# Patient Record
Sex: FEMALE | Race: AMERICAN INDIAN OR ALASKA NATIVE | NOT HISPANIC OR LATINO | Employment: OTHER | URBAN - METROPOLITAN AREA
[De-identification: names, ages, dates, MRNs, and addresses within clinical notes are randomized per-mention and may not be internally consistent; named-entity substitution may affect disease eponyms.]

---

## 2023-11-06 ENCOUNTER — DOCUMENTATION ONLY (OUTPATIENT)
Dept: OTHER | Facility: CLINIC | Age: 68
End: 2023-11-06
Payer: MEDICARE

## 2023-11-06 VITALS
HEIGHT: 66 IN | RESPIRATION RATE: 18 BRPM | WEIGHT: 192 LBS | DIASTOLIC BLOOD PRESSURE: 75 MMHG | HEART RATE: 67 BPM | OXYGEN SATURATION: 91 % | SYSTOLIC BLOOD PRESSURE: 129 MMHG | BODY MASS INDEX: 30.86 KG/M2 | TEMPERATURE: 96.8 F

## 2023-11-06 PROBLEM — H43.9: Status: ACTIVE | Noted: 2023-11-06

## 2023-11-06 PROBLEM — N39.0 RECURRENT URINARY TRACT INFECTION: Status: ACTIVE | Noted: 2023-11-06

## 2023-11-06 PROBLEM — F41.9 ANXIETY DISORDER: Status: ACTIVE | Noted: 2023-11-06

## 2023-11-06 PROBLEM — E55.9 VITAMIN D DEFICIENCY: Status: ACTIVE | Noted: 2023-11-06

## 2023-11-06 PROBLEM — G51.4 FACIAL MYOKYMIA: Status: ACTIVE | Noted: 2023-11-06

## 2023-11-06 PROBLEM — G40.89 OTHER SEIZURES (H): Status: ACTIVE | Noted: 2023-11-06

## 2023-11-06 PROBLEM — F02.80 ALZHEIMER'S DEMENTIA (H): Status: ACTIVE | Noted: 2022-01-31

## 2023-11-06 PROBLEM — H52.4 PRESBYOPIA: Status: ACTIVE | Noted: 2023-11-06

## 2023-11-06 PROBLEM — G30.9 ALZHEIMER'S DEMENTIA (H): Status: ACTIVE | Noted: 2022-01-31

## 2023-11-06 PROBLEM — R41.3 MEMORY IMPAIRMENT: Status: ACTIVE | Noted: 2023-11-06

## 2023-11-06 PROBLEM — F32.A DEPRESSION: Status: ACTIVE | Noted: 2023-11-06

## 2023-11-06 RX ORDER — LANOLIN ALCOHOL/MO/W.PET/CERES
1000 CREAM (GRAM) TOPICAL DAILY
COMMUNITY
End: 2023-11-20

## 2023-11-06 RX ORDER — HYDROXYZINE HYDROCHLORIDE 25 MG/1
12.5 TABLET, FILM COATED ORAL EVERY 6 HOURS PRN
COMMUNITY
End: 2024-06-27

## 2023-11-06 RX ORDER — DONEPEZIL HYDROCHLORIDE 10 MG/1
10 TABLET, FILM COATED ORAL DAILY
COMMUNITY
Start: 2023-08-09 | End: 2023-11-20

## 2023-11-06 RX ORDER — VITAMIN B COMPLEX
25 TABLET ORAL DAILY
COMMUNITY
End: 2023-11-20

## 2023-11-06 RX ORDER — ASPIRIN 81 MG/1
81 TABLET ORAL DAILY
COMMUNITY
End: 2023-11-07

## 2023-11-06 RX ORDER — DONEPEZIL HYDROCHLORIDE 5 MG/1
5 TABLET, FILM COATED ORAL DAILY
COMMUNITY
Start: 2023-08-09 | End: 2023-12-04

## 2023-11-06 RX ORDER — MEMANTINE HYDROCHLORIDE 10 MG/1
1 TABLET ORAL 2 TIMES DAILY
COMMUNITY
Start: 2023-08-09 | End: 2023-11-20

## 2023-11-06 RX ORDER — CHLORAL HYDRATE 500 MG
1 CAPSULE ORAL 2 TIMES DAILY
COMMUNITY
End: 2023-11-20

## 2023-11-06 RX ORDER — NITROFURANTOIN MACROCRYSTAL 100 MG
100 CAPSULE ORAL DAILY
COMMUNITY
End: 2023-12-04

## 2023-11-06 RX ORDER — ESCITALOPRAM OXALATE 5 MG/1
5 TABLET ORAL DAILY
COMMUNITY
End: 2023-11-20

## 2023-11-06 NOTE — PROGRESS NOTES
[place] [facility]  PRIMARY CARE PROVIDER AND CLINIC:  Ifeanyi Chandler, REI CNP, [address] [facility] [address]  Chief Complaint   Patient presents with    Establish Care     [place] Medical Record Number:  [id number]  Place of Service where encounter took place:  [facility] [[id number]]    Kim Herrera  is a 68 year old  (1955),  admitted to the above facility on 11/3/23 .  Admitted to this facility for  rehab, medical management, and nursing care.    HPI:    HPI information obtained from: facility chart records, facility staff, patient report, and [place] Epic chart review.     Resident new to [facility] care. Alert, confused at baseline but pleasant. Denies any acute concerns. Her  is very happy this has mostly been a good transition for her. She declined dinner today stating she wasn't hungry.  History of UTI and responded nicely to treatment. Coming into the facility on prophylaxis  dose of antibiotic.     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  PAST SURGICAL HISTORY:   has a past surgical history that includes appendectomy and Replacement Total Knee (Right).  FAMILY HISTORY: family history includes Arthritis in her father; Colon Cancer in her mother; Hypertension in her sister; Neuropathy in her sister.  SOCIAL HISTORY:   reports that she has never smoked. She has never  "used smokeless tobacco. She reports current alcohol use. She reports that she does not use drugs.    Post Discharge Medication Reconciliation Status: patient was not discharged from an inpatient facility or TCU    Current Outpatient Medications   Medication Sig Dispense Refill    aspirin 81 MG EC tablet Take 81 mg by mouth daily      Calcium Carbonate-Vitamin D (CALCIUM-VITAMIN D3 PO) Take 1 tablet by mouth 2 times daily      cyanocobalamin (VITAMIN B-12) 1000 MCG tablet Take 1,000 mcg by mouth daily      donepezil (ARICEPT) 10 MG tablet Take 10 mg by mouth daily Take with 5 mg for a total of 15 mg      donepezil (ARICEPT) 5 MG tablet Take 5 mg by mouth daily Take with 10 mg for a total of 15 mg      escitalopram (LEXAPRO) 5 MG tablet Take 5 mg by mouth daily      fish oil-omega-3 fatty acids 1000 MG capsule Take 1 g by mouth 2 times daily      hydrOXYzine (ATARAX) 25 MG tablet Take 12.5 mg by mouth every 6 hours as needed for itching      memantine (NAMENDA) 10 MG tablet Take 1 tablet by mouth 2 times daily      nitroFURantoin macrocrystal (MACRODANTIN) 100 MG capsule Take 100 mg by mouth daily      Vitamin D3 (CHOLECALCIFEROL) 25 mcg (1000 units) tablet Take 25 mcg by mouth daily       ROS:  Limited secondary to cognitive impairment but today pt reports ok    Vitals:  /75   Pulse 67   Temp 96.8  F (36  C)   Resp 18   Ht 1.676 m (5' 6\")   Wt 87.1 kg (192 lb)   SpO2 91%   BMI 30.99 kg/m    Exam:  GENERAL APPEARANCE:  Alert, in no distress  ENT:  Mouth and posterior oropharynx normal, moist mucous membranes, normal hearing acuity  EYES:  EOM, conjunctivae, lids, pupils and irises normal  RESP:  lungs clear to auscultation   CV:  regular rate and rhythm, no murmur, rub, or gallop, no edema  ABDOMEN:  no guarding or rebound  M/S:   Gait and station normal  SKIN:  limited but intact to visualized areas  NEURO:   Cranial nerves 2-12 are normal tested and grossly at patient's baseline  PSYCH:  insight and " judgement impaired, memory impaired     Lab/Diagnostic data:  Ordering updated labs today    ASSESSMENT/PLAN:  (G30.0,  F02.A18) 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 Doing well with transition to  so will hold on any changes today  Plan:   -Aricept 15 mg po daily  -namenda 10 mg po BID  -escitalopram 5 mg po daily   -atarax prn    (G40.89) Other seizures (H)  Comment: 8/2023: EEG did not show any epileptiform discharges or seizures. It showed diffuse slowing which can be seen in patient's with dementia. MRI brain also did not show any stroke,   Plan:   -monitor for s/s     (M17.12) Primary osteoarthritis of left knee  Comment: chronic   Plan:   -consider tylenol prn   -calcium and vitamin D    (N39.0) Recurrent urinary tract infection  Comment: hx of   Plan:   -macrodantin 100 mg po daily    (E55.9) Vitamin D deficiency  Comment: on supplement   Plan:   -daily supplement   -checking lab    (E53.8) Vitamin B12 deficiency (non anemic)  Comment: hx of   Plan:   -daily supplement and checking labs    (E78.49) Other hyperlipdemia  Comment: by hx  Plan:  -fish oil daily     (Z78.9) POLST (Physician Orders for Life-Sustaining Treatment)  Comment: reviewed with family  Plan:  -reviewed with family    -will stop ASA for primary prevention    Electronically signed by:  REI Pino CNP

## 2023-11-07 ENCOUNTER — ASSISTED LIVING VISIT (OUTPATIENT)
Dept: GERIATRICS | Facility: CLINIC | Age: 68
End: 2023-11-07
Payer: MEDICARE

## 2023-11-07 DIAGNOSIS — N39.0 RECURRENT URINARY TRACT INFECTION: ICD-10-CM

## 2023-11-07 DIAGNOSIS — E55.9 VITAMIN D DEFICIENCY: ICD-10-CM

## 2023-11-07 DIAGNOSIS — F02.A18 MILD EARLY ONSET ALZHEIMER'S DEMENTIA WITH OTHER BEHAVIORAL DISTURBANCE (H): Primary | ICD-10-CM

## 2023-11-07 DIAGNOSIS — Z78.9 POLST (PHYSICIAN ORDERS FOR LIFE-SUSTAINING TREATMENT): ICD-10-CM

## 2023-11-07 DIAGNOSIS — G40.89 OTHER SEIZURES (H): ICD-10-CM

## 2023-11-07 DIAGNOSIS — E78.49 OTHER HYPERLIPIDEMIA: ICD-10-CM

## 2023-11-07 DIAGNOSIS — G30.0 MILD EARLY ONSET ALZHEIMER'S DEMENTIA WITH OTHER BEHAVIORAL DISTURBANCE (H): Primary | ICD-10-CM

## 2023-11-07 DIAGNOSIS — M17.12 PRIMARY OSTEOARTHRITIS OF LEFT KNEE: ICD-10-CM

## 2023-11-07 DIAGNOSIS — E53.8 VITAMIN B12 DEFICIENCY (NON ANEMIC): ICD-10-CM

## 2023-11-07 DIAGNOSIS — F41.9 ANXIETY DISORDER, UNSPECIFIED TYPE: ICD-10-CM

## 2023-11-07 PROCEDURE — 99344 HOME/RES VST NEW MOD MDM 60: CPT | Performed by: NURSE PRACTITIONER

## 2023-11-07 NOTE — LETTER
11/7/2023        RE: Kim Herrera  C/o Herbie Herrera  [address] [facility] [address]        [place] [facility]  PRIMARY CARE PROVIDER AND CLINIC:  REI Pino [facility], [address] [facility] [address]  Chief Complaint   Patient presents with     [facility]     [place] Medical Record Number:  [id number]  Place of Service where encounter took place:  [facility] ([facility] [[id number]]    Kim Herrera  is a 68 year old  (1955),  admitted to the above facility on 11/3/23 .  Admitted to this facility for  rehab, medical management, and nursing care.    HPI:    HPI information obtained from: facility chart records, facility staff, patient report, and [facility] chart review.     Resident new to memory care. Alert, confused at baseline but pleasant. Denies any acute concerns. Her  is very happy this has mostly been a good transition for her. She declined dinner today stating she wasn't hungry.  History of UTI and responded nicely to treatment. Coming into the facility on prophylaxis  dose of antibiotic.     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  PAST SURGICAL HISTORY:   has a past surgical history that includes appendectomy and Replacement Total Knee (Right).  FAMILY HISTORY: family history includes Arthritis in her father; Colon Cancer in her mother; Hypertension in  "her sister; Neuropathy in her sister.  SOCIAL HISTORY:   reports that she has never smoked. She has never used smokeless tobacco. She reports current alcohol use. She reports that she does not use drugs.    Post Discharge Medication Reconciliation Status: patient was not discharged from an inpatient facility or TCU    Current Outpatient Medications   Medication Sig Dispense Refill     aspirin 81 MG EC tablet Take 81 mg by mouth daily       Calcium Carbonate-Vitamin D (CALCIUM-VITAMIN D3 PO) Take 1 tablet by mouth 2 times daily       cyanocobalamin (VITAMIN B-12) 1000 MCG tablet Take 1,000 mcg by mouth daily       donepezil (ARICEPT) 10 MG tablet Take 10 mg by mouth daily Take with 5 mg for a total of 15 mg       donepezil (ARICEPT) 5 MG tablet Take 5 mg by mouth daily Take with 10 mg for a total of 15 mg       escitalopram (LEXAPRO) 5 MG tablet Take 5 mg by mouth daily       fish oil-omega-3 fatty acids 1000 MG capsule Take 1 g by mouth 2 times daily       hydrOXYzine (ATARAX) 25 MG tablet Take 12.5 mg by mouth every 6 hours as needed for itching       memantine (NAMENDA) 10 MG tablet Take 1 tablet by mouth 2 times daily       nitroFURantoin macrocrystal (MACRODANTIN) 100 MG capsule Take 100 mg by mouth daily       Vitamin D3 (CHOLECALCIFEROL) 25 mcg (1000 units) tablet Take 25 mcg by mouth daily       ROS:  Limited secondary to cognitive impairment but today pt reports ok    Vitals:  /75   Pulse 67   Temp 96.8  F (36  C)   Resp 18   Ht 1.676 m (5' 6\")   Wt 87.1 kg (192 lb)   SpO2 91%   BMI 30.99 kg/m    Exam:  GENERAL APPEARANCE:  Alert, in no distress  ENT:  Mouth and posterior oropharynx normal, moist mucous membranes, normal hearing acuity  EYES:  EOM, conjunctivae, lids, pupils and irises normal  RESP:  lungs clear to auscultation   CV:  regular rate and rhythm, no murmur, rub, or gallop, no edema  ABDOMEN:  no guarding or rebound  M/S:   Gait and station normal  SKIN:  limited but intact to " visualized areas  NEURO:   Cranial nerves 2-12 are normal tested and grossly at patient's baseline  PSYCH:  insight and judgement impaired, memory impaired     Lab/Diagnostic data:  Ordering updated labs today    ASSESSMENT/PLAN:  (G30.0,  F02.A18) 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 Doing well with transition to  so will hold on any changes today  Plan:   -Aricept 15 mg po daily  -namenda 10 mg po BID  -escitalopram 5 mg po daily   -atarax prn    (G40.89) Other seizures (H)  Comment: 8/2023: EEG did not show any epileptiform discharges or seizures. It showed diffuse slowing which can be seen in patient's with dementia. MRI brain also did not show any stroke,   Plan:   -monitor for s/s     (M17.12) Primary osteoarthritis of left knee  Comment: chronic   Plan:   -consider tylenol prn   -calcium and vitamin D    (N39.0) Recurrent urinary tract infection  Comment: hx of   Plan:   -macrodantin 100 mg po daily    (E55.9) Vitamin D deficiency  Comment: on supplement   Plan:   -daily supplement   -checking lab    (E53.8) Vitamin B12 deficiency (non anemic)  Comment: hx of   Plan:   -daily supplement and checking labs    (E78.49) Other hyperlipdemia  Comment: by hx  Plan:  -fish oil daily     (Z78.9) POLST (Physician Orders for Life-Sustaining Treatment)  Comment: reviewed with family  Plan:  -reviewed with family    -will stop ASA for primary prevention    Electronically signed by:  REI Pino CNP                   Sincerely,        REI Pino CNP

## 2023-11-08 ENCOUNTER — LAB REQUISITION (OUTPATIENT)
Dept: LAB | Facility: CLINIC | Age: 68
End: 2023-11-08
Payer: MEDICARE

## 2023-11-08 DIAGNOSIS — E03.9 HYPOTHYROIDISM, UNSPECIFIED: ICD-10-CM

## 2023-11-08 DIAGNOSIS — E55.9 VITAMIN D DEFICIENCY, UNSPECIFIED: ICD-10-CM

## 2023-11-08 DIAGNOSIS — E78.5 HYPERLIPIDEMIA, UNSPECIFIED: ICD-10-CM

## 2023-11-14 LAB
ALBUMIN SERPL BCG-MCNC: 3.8 G/DL (ref 3.5–5.2)
ALP SERPL-CCNC: 67 U/L (ref 35–104)
ALT SERPL W P-5'-P-CCNC: 16 U/L (ref 0–50)
ANION GAP SERPL CALCULATED.3IONS-SCNC: 14 MMOL/L (ref 7–15)
AST SERPL W P-5'-P-CCNC: 22 U/L (ref 0–45)
BILIRUB DIRECT SERPL-MCNC: <0.2 MG/DL (ref 0–0.3)
BILIRUB SERPL-MCNC: 0.3 MG/DL
BUN SERPL-MCNC: 11.4 MG/DL (ref 8–23)
CALCIUM SERPL-MCNC: 9.2 MG/DL (ref 8.8–10.2)
CHLORIDE SERPL-SCNC: 106 MMOL/L (ref 98–107)
CHOLEST SERPL-MCNC: 228 MG/DL
CREAT SERPL-MCNC: 0.76 MG/DL (ref 0.51–0.95)
DEPRECATED HCO3 PLAS-SCNC: 22 MMOL/L (ref 22–29)
EGFRCR SERPLBLD CKD-EPI 2021: 85 ML/MIN/1.73M2
ERYTHROCYTE [DISTWIDTH] IN BLOOD BY AUTOMATED COUNT: 13.1 % (ref 10–15)
GLUCOSE SERPL-MCNC: 90 MG/DL (ref 70–99)
HCT VFR BLD AUTO: 42.7 % (ref 35–47)
HDLC SERPL-MCNC: 56 MG/DL
HGB BLD-MCNC: 14 G/DL (ref 11.7–15.7)
LDLC SERPL CALC-MCNC: 136 MG/DL
MCH RBC QN AUTO: 30.8 PG (ref 26.5–33)
MCHC RBC AUTO-ENTMCNC: 32.8 G/DL (ref 31.5–36.5)
MCV RBC AUTO: 94 FL (ref 78–100)
NONHDLC SERPL-MCNC: 172 MG/DL
PLATELET # BLD AUTO: 250 10E3/UL (ref 150–450)
POTASSIUM SERPL-SCNC: 3.8 MMOL/L (ref 3.4–5.3)
PROT SERPL-MCNC: 6.8 G/DL (ref 6.4–8.3)
RBC # BLD AUTO: 4.55 10E6/UL (ref 3.8–5.2)
SODIUM SERPL-SCNC: 142 MMOL/L (ref 135–145)
TRIGL SERPL-MCNC: 181 MG/DL
TSH SERPL DL<=0.005 MIU/L-ACNC: 1.96 UIU/ML (ref 0.3–4.2)
VIT B12 SERPL-MCNC: 1461 PG/ML (ref 232–1245)
VIT D+METAB SERPL-MCNC: 35 NG/ML (ref 20–50)
WBC # BLD AUTO: 5 10E3/UL (ref 4–11)

## 2023-11-14 PROCEDURE — 82248 BILIRUBIN DIRECT: CPT | Mod: ORL | Performed by: NURSE PRACTITIONER

## 2023-11-14 PROCEDURE — 80061 LIPID PANEL: CPT | Mod: ORL | Performed by: NURSE PRACTITIONER

## 2023-11-14 PROCEDURE — 84443 ASSAY THYROID STIM HORMONE: CPT | Mod: ORL | Performed by: NURSE PRACTITIONER

## 2023-11-14 PROCEDURE — P9604 ONE-WAY ALLOW PRORATED TRIP: HCPCS | Mod: ORL | Performed by: NURSE PRACTITIONER

## 2023-11-14 PROCEDURE — 82306 VITAMIN D 25 HYDROXY: CPT | Mod: ORL | Performed by: NURSE PRACTITIONER

## 2023-11-14 PROCEDURE — 85027 COMPLETE CBC AUTOMATED: CPT | Mod: ORL | Performed by: NURSE PRACTITIONER

## 2023-11-14 PROCEDURE — 82607 VITAMIN B-12: CPT | Mod: ORL | Performed by: NURSE PRACTITIONER

## 2023-11-14 PROCEDURE — 36415 COLL VENOUS BLD VENIPUNCTURE: CPT | Mod: ORL | Performed by: NURSE PRACTITIONER

## 2023-11-20 DIAGNOSIS — G30.9 ALZHEIMER'S DEMENTIA (H): ICD-10-CM

## 2023-11-20 DIAGNOSIS — Z78.9 TAKES DIETARY SUPPLEMENTS: Primary | ICD-10-CM

## 2023-11-20 DIAGNOSIS — F32.A DEPRESSION, UNSPECIFIED DEPRESSION TYPE: ICD-10-CM

## 2023-11-20 DIAGNOSIS — F02.80 ALZHEIMER'S DEMENTIA (H): ICD-10-CM

## 2023-11-20 RX ORDER — MEMANTINE HYDROCHLORIDE 10 MG/1
10 TABLET ORAL 2 TIMES DAILY
Qty: 30 TABLET | Refills: 11 | Status: SHIPPED | OUTPATIENT
Start: 2023-11-20

## 2023-11-20 RX ORDER — ESCITALOPRAM OXALATE 5 MG/1
5 TABLET ORAL DAILY
Qty: 30 TABLET | Refills: 11 | Status: SHIPPED | OUTPATIENT
Start: 2023-11-20 | End: 2024-07-23

## 2023-11-20 RX ORDER — LANOLIN ALCOHOL/MO/W.PET/CERES
1000 CREAM (GRAM) TOPICAL DAILY
Qty: 30 TABLET | Refills: 11 | Status: SHIPPED | OUTPATIENT
Start: 2023-11-20 | End: 2024-07-23

## 2023-11-20 RX ORDER — DONEPEZIL HYDROCHLORIDE 10 MG/1
10 TABLET, FILM COATED ORAL DAILY
Qty: 30 TABLET | Refills: 11 | Status: SHIPPED | OUTPATIENT
Start: 2023-11-20 | End: 2024-07-23 | Stop reason: DRUGHIGH

## 2023-11-20 RX ORDER — VITAMIN B COMPLEX
25 TABLET ORAL DAILY
Qty: 30 TABLET | Refills: 11 | Status: SHIPPED | OUTPATIENT
Start: 2023-11-20

## 2023-11-20 RX ORDER — CHLORAL HYDRATE 500 MG
1 CAPSULE ORAL 2 TIMES DAILY
Qty: 30 CAPSULE | Refills: 11 | Status: SHIPPED | OUTPATIENT
Start: 2023-11-20 | End: 2024-05-06

## 2023-12-04 DIAGNOSIS — G30.9 ALZHEIMER'S DEMENTIA (H): Primary | ICD-10-CM

## 2023-12-04 DIAGNOSIS — F02.80 ALZHEIMER'S DEMENTIA (H): Primary | ICD-10-CM

## 2023-12-04 DIAGNOSIS — N39.0 RECURRENT URINARY TRACT INFECTION: ICD-10-CM

## 2023-12-04 RX ORDER — NITROFURANTOIN MACROCRYSTAL 100 MG
100 CAPSULE ORAL DAILY
Qty: 90 CAPSULE | Refills: 3 | Status: SHIPPED | OUTPATIENT
Start: 2023-12-04

## 2023-12-04 RX ORDER — DONEPEZIL HYDROCHLORIDE 5 MG/1
5 TABLET, FILM COATED ORAL DAILY
Qty: 90 TABLET | Refills: 3 | Status: SHIPPED | OUTPATIENT
Start: 2023-12-04 | End: 2024-05-28

## 2023-12-07 DIAGNOSIS — Z78.9 TAKES DIETARY SUPPLEMENTS: ICD-10-CM

## 2023-12-12 ENCOUNTER — DOCUMENTATION ONLY (OUTPATIENT)
Dept: OTHER | Facility: CLINIC | Age: 68
End: 2023-12-12
Payer: MEDICARE

## 2023-12-21 ENCOUNTER — DOCUMENTATION ONLY (OUTPATIENT)
Dept: GERIATRICS | Facility: CLINIC | Age: 68
End: 2023-12-21
Payer: MEDICARE

## 2023-12-21 DIAGNOSIS — Z78.9 TAKES DIETARY SUPPLEMENTS: ICD-10-CM

## 2024-01-16 ENCOUNTER — ASSISTED LIVING VISIT (OUTPATIENT)
Dept: GERIATRICS | Facility: CLINIC | Age: 69
End: 2024-01-16
Payer: MEDICARE

## 2024-01-16 VITALS
TEMPERATURE: 96.4 F | WEIGHT: 189.6 LBS | SYSTOLIC BLOOD PRESSURE: 130 MMHG | HEIGHT: 66 IN | BODY MASS INDEX: 30.47 KG/M2 | HEART RATE: 92 BPM | OXYGEN SATURATION: 96 % | RESPIRATION RATE: 24 BRPM | DIASTOLIC BLOOD PRESSURE: 68 MMHG

## 2024-01-16 DIAGNOSIS — F02.B4 MODERATE EARLY ONSET ALZHEIMER'S DEMENTIA WITH ANXIETY (H): Primary | ICD-10-CM

## 2024-01-16 DIAGNOSIS — E55.9 VITAMIN D DEFICIENCY: ICD-10-CM

## 2024-01-16 DIAGNOSIS — L98.9 SKIN LESION: ICD-10-CM

## 2024-01-16 DIAGNOSIS — N39.0 RECURRENT URINARY TRACT INFECTION: ICD-10-CM

## 2024-01-16 DIAGNOSIS — E53.8 VITAMIN B12 DEFICIENCY (NON ANEMIC): ICD-10-CM

## 2024-01-16 DIAGNOSIS — G30.0 MODERATE EARLY ONSET ALZHEIMER'S DEMENTIA WITH ANXIETY (H): Primary | ICD-10-CM

## 2024-01-16 PROCEDURE — 99349 HOME/RES VST EST MOD MDM 40: CPT | Performed by: INTERNAL MEDICINE

## 2024-01-16 NOTE — LETTER
1/16/2024        RE: Kim Herrera  C/o Herbie Herrera  [address] [facility] [address]        No notes on file      Sincerely,        Ann Rangel MD

## 2024-01-16 NOTE — PROGRESS NOTES
"Kim Herrera is a 68 year old female seen 2024 at [facility] unit where she has resided for 2 months (admit 2023) seen for initial visit.   Pt is seen in her apartment up ambulating without device, daughter Stacey is present and helps with history      Pt states she feels okay, smiles and winks.   Calls her daughter her sister, and her  her grandfather.      Daughter reports \"Zero issues with transition, likes her new place.\"       Stays with Stacey up to 2 weeks at times, just back from a 3 day stay there   Loves to color   Won a hat at [facility]'ed with early onset Alzheimer's dementia in 2019 based on brain MRI and PET scan, followed by [facility]    She had spells of LOC in , EEG, repeat brain MRI unremarkable, Holter monitor and ECHO unremarkable.   No recent spells     Past Medical History:   Diagnosis Date    Alzheimer's dementia (H) 2022    Anxiety disorder 2023    Depression 2023    Disorder of vitreous body 2023    Facial myokymia 2023    Hyperlipidemia 01/15/2007    Memory impairment 2023    Other seizures (H) 2023    Presbyopia 2023    Primary osteoarthritis of left knee 2015    Recurrent urinary tract infection 2023    Vitamin D deficiency 2023       Past Surgical History:   Procedure Laterality Date    APPENDECTOMY      REPLACEMENT TOTAL KNEE Right      SH:  Previously lived with her  Herbie in [facility]   Daughter Stacey in [place]   Son Fahad in [place]   Son Gunner  in a MVA in    Six granddaughters   Non smoker     ROS:    Weight in 2023 was 190 lbs   Wt Readings from Last 5 Encounters:   24 86 kg (189 lb 9.6 oz)   23 87.1 kg (192 lb)      EXAM:  NAD  /68   Pulse 92   Temp (!) 96.4  F (35.8  C)   Resp 24   Ht 1.676 m (5' 6\")   Wt 86 kg (189 lb 9.6 oz)   SpO2 96%   BMI 30.60 kg/m     Neck supple without adenopathy  Small " 5mm mole on right side of back, but dark in color     Lungs clear bilaterally with good air movement  Heart RRR s1s2   Abd soft, NT, no distention or guarding, +BS  Ext without edema   Neuro: limited verbal skills, disoriented and confabulating    Tremor of legs or hands at times, not all at once   Psych: affect okay, pleasant     Labs May 2023   Cr 0.78    MRI Brain 8/18/2022  No acute infarction, intracranial hemorrhage, or mass. Global parenchymal volume loss with bilateral temporoparietal collection. No hydrocephalus or extra axial collection. No abnormal enhancement. 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 Intraorbital contents are normal. Bilateral mastoid effusions.   IMPRESSION:   Global parenchymal volume loss with bilateral temporoparietal predilection which can be seen in the setting of primary neurodegenerative process       IMP/PLAN:   (G30.0,  F02.B4) Moderate early onset Alzheimer's dementia with anxiety (H)  (primary encounter diagnosis)  Comment: disorientation, decline in functional   Plan: remains on donepezil 15 mg /day and memantine 10 mg bid although no benefit per Neurology   [facility] Care support for med admin, meals, activity and secure unit     Escitalopram 5 mg/day and hydroxyzine 25 mg PRN for anxious features     (N39.0) Recurrent urinary tract infection  Comment: by hx  Plan: remains on PTA prophylactic nitrofurantoin 100 mg/day     (E53.8) Vitamin B12 deficiency (non anemic)  Comment: reported low level, but lab not found   Plan: remains on vit B12 1000 mcg/day   Check level in next labs     (E55.9) Vitamin D deficiency  Comment: no lab found  Plan: vit D 25 mcg/day, dietary calcium for bone health       (L98.9) Skin lesion  Comment: as above, daughter concerned  Plan: Dermatology referral for bx     Advance directive: full code per signed AZAM Rangel MD

## 2024-02-06 PROBLEM — G40.89 OTHER SEIZURES (H): Status: RESOLVED | Noted: 2023-11-06 | Resolved: 2024-02-06

## 2024-02-06 PROBLEM — F02.80 ALZHEIMER'S DEMENTIA (H): Status: RESOLVED | Noted: 2022-01-31 | Resolved: 2024-02-06

## 2024-02-06 PROBLEM — G30.9 ALZHEIMER'S DEMENTIA (H): Status: RESOLVED | Noted: 2022-01-31 | Resolved: 2024-02-06

## 2024-03-05 ENCOUNTER — ASSISTED LIVING VISIT (OUTPATIENT)
Dept: GERIATRICS | Facility: CLINIC | Age: 69
End: 2024-03-05
Payer: MEDICARE

## 2024-03-05 VITALS
HEART RATE: 68 BPM | BODY MASS INDEX: 29.63 KG/M2 | WEIGHT: 184.4 LBS | TEMPERATURE: 96.9 F | DIASTOLIC BLOOD PRESSURE: 52 MMHG | RESPIRATION RATE: 17 BRPM | OXYGEN SATURATION: 94 % | SYSTOLIC BLOOD PRESSURE: 122 MMHG | HEIGHT: 66 IN

## 2024-03-05 DIAGNOSIS — H61.91 EARLOBE LESION, RIGHT: Primary | ICD-10-CM

## 2024-03-05 PROCEDURE — 99349 HOME/RES VST EST MOD MDM 40: CPT | Performed by: NURSE PRACTITIONER

## 2024-03-05 NOTE — PROGRESS NOTES
[place] [facility]  [place] Medical Record Number:  [id number]  Place of Service where encounter took place:  [facility] [[id number]]  Chief Complaint   Patient presents with    RECHECK       HPI:    Kim Herrera  is a 68 year old (1955), who is being seen today for an episodic care visit.  HPI information obtained from: facility chart records, facility staff, patient report, and family/first contact  report. Today's concern is:    Seen today for area of right earlobe with increased redness. On pinna, the antithetical fold, superior has some increased redness and swelling to a 0.5cm area.  No pain with examination. No history of SCC or BCC per . No known prior piercing to this area. She does sleep throughout the night on her right side so could  be irritated.      Past Medical and Surgical History reviewed in Epic today.    MEDICATIONS:    Current Outpatient Medications   Medication Sig Dispense Refill    Calcium Carb-Cholecalciferol (CALCIUM-VITAMIN D3) 600-12.5 MG-MCG CAPS Take 1 tablet by mouth 2 times daily 60 capsule 11    cyanocobalamin (VITAMIN B-12) 1000 MCG tablet Take 1 tablet (1,000 mcg) by mouth daily 30 tablet 11    donepezil (ARICEPT) 10 MG tablet Take 1 tablet (10 mg) by mouth daily Take with 5 mg for a total of 15 mg 30 tablet 11    donepezil (ARICEPT) 5 MG tablet Take 1 tablet (5 mg) by mouth daily Take with 10 mg for a total of 15 mg 90 tablet 3    escitalopram (LEXAPRO) 5 MG tablet Take 1 tablet (5 mg) by mouth daily 30 tablet 11    fish oil-omega-3 fatty acids 1000 MG capsule Take 1 capsule (1 g) by mouth 2 times daily 30 capsule 11    hydrOXYzine (ATARAX) 25 MG tablet Take 12.5 mg by mouth every 6 hours as needed for itching      memantine (NAMENDA) 10 MG tablet Take 1 tablet (10 mg) by mouth 2 times daily 30 tablet 11    nitroFURantoin macrocrystal (MACRODANTIN) 100 MG capsule Take 1 capsule (100 mg) by mouth daily 90 capsule 3    Vitamin  "D3 (CHOLECALCIFEROL) 25 mcg (1000 units) tablet Take 1 tablet (25 mcg) by mouth daily 30 tablet 11     REVIEW OF SYSTEMS:  Limited secondary to cognitive impairment but today pt reports ok    Objective:  /52   Pulse 68   Temp 96.9  F (36.1  C)   Resp 17   Ht 1.676 m (5' 6\")   Wt 83.6 kg (184 lb 6.4 oz)   SpO2 94%   BMI 29.76 kg/m    Exam:  GENERAL APPEARANCE:  Alert, in no distress  ENT:  Mouth and posterior oropharynx normal, moist mucous membranes, normal hearing acuity  EYES:  EOM, conjunctivae, lids, pupils and irises normal  RESP:  lungs clear to auscultation   CV:  regular rate and rhythm, no murmur, rub, or gallop, no edema  ABDOMEN:  no guarding or rebound  M/S:   Gait and station normal  SKIN:  slight redness/swelling to right area of earlobe, no pain, no drainage  NEURO:   Cranial nerves 2-12 are normal tested and grossly at patient's baseline  PSYCH:  insight and judgement impaired, memory impaired     Labs:   CBC RESULTS:   Recent Labs   Lab Test 11/14/23  0600   WBC 5.0   RBC 4.55   HGB 14.0   HCT 42.7   MCV 94   MCH 30.8   MCHC 32.8   RDW 13.1          Last Basic Metabolic Panel:  Recent Labs   Lab Test 11/14/23  0600      POTASSIUM 3.8   CHLORIDE 106   NERIS 9.2   CO2 22   BUN 11.4   CR 0.76   GLC 90       Liver Function Studies -   Recent Labs   Lab Test 11/14/23  0600   PROTTOTAL 6.8   ALBUMIN 3.8   BILITOTAL 0.3   ALKPHOS 67   AST 22   ALT 16       TSH   Date Value Ref Range Status   11/14/2023 1.96 0.30 - 4.20 uIU/mL Final       No results found for: \"A1C\"    ASSESSMENT/PLAN:  (H61.91) Earlobe lesion, right  (primary encounter diagnosis)  Comment: noticed by staff  Plan:   -will monitor for spontaneous resolution. Reviewed with her  today if continues they may see evaluation with dermatology. Staff to alert with any s/s of infection: increased redness, warm, swelling, drainage or pain      Electronically signed by:  REI Pino CNP           "

## 2024-04-16 ENCOUNTER — ASSISTED LIVING VISIT (OUTPATIENT)
Dept: GERIATRICS | Facility: CLINIC | Age: 69
End: 2024-04-16
Payer: MEDICARE

## 2024-04-16 VITALS
DIASTOLIC BLOOD PRESSURE: 70 MMHG | OXYGEN SATURATION: 97 % | SYSTOLIC BLOOD PRESSURE: 121 MMHG | HEIGHT: 66 IN | HEART RATE: 60 BPM | BODY MASS INDEX: 29.57 KG/M2 | WEIGHT: 184 LBS | RESPIRATION RATE: 16 BRPM

## 2024-04-16 DIAGNOSIS — D22.9 SKIN MOLE: ICD-10-CM

## 2024-04-16 DIAGNOSIS — M17.12 PRIMARY OSTEOARTHRITIS OF LEFT KNEE: ICD-10-CM

## 2024-04-16 DIAGNOSIS — N39.0 RECURRENT URINARY TRACT INFECTION: ICD-10-CM

## 2024-04-16 DIAGNOSIS — G30.0 MILD EARLY ONSET ALZHEIMER'S DEMENTIA WITH OTHER BEHAVIORAL DISTURBANCE (H): Primary | ICD-10-CM

## 2024-04-16 DIAGNOSIS — F02.A18 MILD EARLY ONSET ALZHEIMER'S DEMENTIA WITH OTHER BEHAVIORAL DISTURBANCE (H): Primary | ICD-10-CM

## 2024-04-16 DIAGNOSIS — F41.9 ANXIETY DISORDER, UNSPECIFIED TYPE: ICD-10-CM

## 2024-04-16 PROCEDURE — 99349 HOME/RES VST EST MOD MDM 40: CPT | Performed by: NURSE PRACTITIONER

## 2024-04-16 NOTE — PROGRESS NOTES
[place] [facility]  [place] Medical Record Number:  [id number]  Place of Service where encounter took place:  [facility] [[id number]]  Chief Complaint   Patient presents with    Assisted Living Acute       HPI:    Kim Herrera  is a 68 year old (1955), who is being seen today for an episodic care visit.  HPI information obtained from: facility chart records, facility staff, patient report, and [facility] chart review. Today's concern is:    Seen today for follow up to chronic disease management. Staff reporting she does become more anxious at times. She is on celexa and prn atarax which is used 1-2 times monthly. Dementia is progressing per staff. She attends many LOAs with family which may contribute to agitation. Calm and pleasant today, a bit nonsensical with conversation. She is excited about her new haircut.     Past Medical and Surgical History reviewed in Epic today.    MEDICATIONS:    Current Outpatient Medications   Medication Sig Dispense Refill    Calcium Carb-Cholecalciferol (CALCIUM-VITAMIN D3) 600-12.5 MG-MCG CAPS Take 1 tablet by mouth 2 times daily 60 capsule 11    cyanocobalamin (VITAMIN B-12) 1000 MCG tablet Take 1 tablet (1,000 mcg) by mouth daily 30 tablet 11    donepezil (ARICEPT) 10 MG tablet Take 1 tablet (10 mg) by mouth daily Take with 5 mg for a total of 15 mg 30 tablet 11    donepezil (ARICEPT) 5 MG tablet Take 1 tablet (5 mg) by mouth daily Take with 10 mg for a total of 15 mg 90 tablet 3    escitalopram (LEXAPRO) 5 MG tablet Take 1 tablet (5 mg) by mouth daily 30 tablet 11    fish oil-omega-3 fatty acids 1000 MG capsule Take 1 capsule (1 g) by mouth 2 times daily 30 capsule 11    hydrOXYzine (ATARAX) 25 MG tablet Take 12.5 mg by mouth every 6 hours as needed for anxiety      memantine (NAMENDA) 10 MG tablet Take 1 tablet (10 mg) by mouth 2 times daily 30 tablet 11    nitroFURantoin macrocrystal (MACRODANTIN) 100 MG capsule Take 1 capsule (100  "mg) by mouth daily 90 capsule 3    Vitamin D3 (CHOLECALCIFEROL) 25 mcg (1000 units) tablet Take 1 tablet (25 mcg) by mouth daily 30 tablet 11     REVIEW OF SYSTEMS:  Limited secondary to cognitive impairment but today pt reports fine    Objective:  /70   Pulse 60   Resp 16   Ht 1.676 m (5' 6\")   Wt 83.5 kg (184 lb)   SpO2 97%   BMI 29.70 kg/m    Exam:  GENERAL APPEARANCE:  Alert, in no distress  ENT:  Mouth and posterior oropharynx normal, moist mucous membranes, normal hearing acuity  EYES:  EOM, conjunctivae, lids, pupils and irises normal  RESP:  lungs clear to auscultation   CV:  regular rate and rhythm, no murmur, rub, or gallop, no edema  ABDOMEN:  no guarding or rebound  M/S:   Gait and station normal  SKIN:  limited-some redness to bra line on back, small round, regular boarder pinpoint mole on back in right side, mid back   NEURO:   Cranial nerves 2-12 are normal tested and grossly at patient's baseline  PSYCH:  insight and judgement impaired, memory impaired     Labs:   CBC RESULTS:   Recent Labs   Lab Test 11/14/23  0600   WBC 5.0   RBC 4.55   HGB 14.0   HCT 42.7   MCV 94   MCH 30.8   MCHC 32.8   RDW 13.1          Last Basic Metabolic Panel:  Recent Labs   Lab Test 11/14/23  0600      POTASSIUM 3.8   CHLORIDE 106   NERIS 9.2   CO2 22   BUN 11.4   CR 0.76   GLC 90       Liver Function Studies -   Recent Labs   Lab Test 11/14/23  0600   PROTTOTAL 6.8   ALBUMIN 3.8   BILITOTAL 0.3   ALKPHOS 67   AST 22   ALT 16       TSH   Date Value Ref Range Status   11/14/2023 1.96 0.30 - 4.20 uIU/mL Final       No results found for: \"A1C\"    ASSESSMENT/PLAN:  (G30.0,  F02.A18) 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 Doing well with transition to . Some increased anxiety at times  Plan:   -Aricept 15 mg po daily  -namenda 10 mg po " BID  -escitalopram 5 mg po daily left message with family. Could increase to 10 mg po daily   -atarax prn    (M17.12) Primary osteoarthritis of left knee  Comment: chronic   Plan:   -consider tylenol prn   -calcium and vitamin D     (N39.0) Recurrent urinary tract infection  Comment: hx of   Plan:   -macrodantin 100 mg po daily     (D22.9) Skin mole  Comment: noted on exam and per  could follow up with dermatology  Plan:  -monitor for now and will discuss dermatology eval with family         Electronically signed by:  REI Pino CNP

## 2024-04-16 NOTE — LETTER
4/16/2024        RE: Kim Herrera  C/o Herbie Herrera  [address] [facility] [address]        [facility] [facility]  [place] Medical Record Number:  [id number]  Place of Service where encounter took place:  [facility] [[id number]]  Chief Complaint   Patient presents with     Assisted Living Acute       HPI:    Kim Herrera  is a 68 year old (1955), who is being seen today for an episodic care visit.  HPI information obtained from: facility chart records, facility staff, patient report, and [facility] chart review. Today's concern is:    Seen today for follow up to chronic disease management. Staff reporting she does become more anxious at times. She is on celexa and prn atarax which is used 1-2 times monthly. Dementia is progressing per staff. She attends many LOAs with family which may contribute to agitation. Calm and pleasant today, a bit nonsensical with conversation. She is excited about her new haircut.     Past Medical and Surgical History reviewed in Epic today.    MEDICATIONS:    Current Outpatient Medications   Medication Sig Dispense Refill     Calcium Carb-Cholecalciferol (CALCIUM-VITAMIN D3) 600-12.5 MG-MCG CAPS Take 1 tablet by mouth 2 times daily 60 capsule 11     cyanocobalamin (VITAMIN B-12) 1000 MCG tablet Take 1 tablet (1,000 mcg) by mouth daily 30 tablet 11     donepezil (ARICEPT) 10 MG tablet Take 1 tablet (10 mg) by mouth daily Take with 5 mg for a total of 15 mg 30 tablet 11     donepezil (ARICEPT) 5 MG tablet Take 1 tablet (5 mg) by mouth daily Take with 10 mg for a total of 15 mg 90 tablet 3     escitalopram (LEXAPRO) 5 MG tablet Take 1 tablet (5 mg) by mouth daily 30 tablet 11     fish oil-omega-3 fatty acids 1000 MG capsule Take 1 capsule (1 g) by mouth 2 times daily 30 capsule 11     hydrOXYzine (ATARAX) 25 MG tablet Take 12.5 mg by mouth every 6 hours as needed for anxiety       memantine (NAMENDA) 10 MG tablet Take 1 tablet (10  "mg) by mouth 2 times daily 30 tablet 11     nitroFURantoin macrocrystal (MACRODANTIN) 100 MG capsule Take 1 capsule (100 mg) by mouth daily 90 capsule 3     Vitamin D3 (CHOLECALCIFEROL) 25 mcg (1000 units) tablet Take 1 tablet (25 mcg) by mouth daily 30 tablet 11     REVIEW OF SYSTEMS:  Limited secondary to cognitive impairment but today pt reports fine    Objective:  /70   Pulse 60   Resp 16   Ht 1.676 m (5' 6\")   Wt 83.5 kg (184 lb)   SpO2 97%   BMI 29.70 kg/m    Exam:  GENERAL APPEARANCE:  Alert, in no distress  ENT:  Mouth and posterior oropharynx normal, moist mucous membranes, normal hearing acuity  EYES:  EOM, conjunctivae, lids, pupils and irises normal  RESP:  lungs clear to auscultation   CV:  regular rate and rhythm, no murmur, rub, or gallop, no edema  ABDOMEN:  no guarding or rebound  M/S:   Gait and station normal  SKIN:  limited-some redness to bra line on back, small round, regular boarder pinpoint mole on back in right side, mid back   NEURO:   Cranial nerves 2-12 are normal tested and grossly at patient's baseline  PSYCH:  insight and judgement impaired, memory impaired     Labs:   CBC RESULTS:   Recent Labs   Lab Test 11/14/23  0600   WBC 5.0   RBC 4.55   HGB 14.0   HCT 42.7   MCV 94   MCH 30.8   MCHC 32.8   RDW 13.1          Last Basic Metabolic Panel:  Recent Labs   Lab Test 11/14/23  0600      POTASSIUM 3.8   CHLORIDE 106   NERIS 9.2   CO2 22   BUN 11.4   CR 0.76   GLC 90       Liver Function Studies -   Recent Labs   Lab Test 11/14/23  0600   PROTTOTAL 6.8   ALBUMIN 3.8   BILITOTAL 0.3   ALKPHOS 67   AST 22   ALT 16       TSH   Date Value Ref Range Status   11/14/2023 1.96 0.30 - 4.20 uIU/mL Final       No results found for: \"A1C\"    ASSESSMENT/PLAN:  (G30.0,  F02.A18) 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 " Doing well with transition to . Some increased anxiety at times  Plan:   -Aricept 15 mg po daily  -namenda 10 mg po BID  -escitalopram 5 mg po daily left message with family. Could increase to 10 mg po daily   -atarax prn    (M17.12) Primary osteoarthritis of left knee  Comment: chronic   Plan:   -consider tylenol prn   -calcium and vitamin D     (N39.0) Recurrent urinary tract infection  Comment: hx of   Plan:   -macrodantin 100 mg po daily     (D22.9) Skin mole  Comment: noted on exam and per  could follow up with dermatology  Plan:  -monitor for now and will discuss dermatology eval with family         Electronically signed by:  REI Pino CNP               Sincerely,        REI Pino CNP

## 2024-05-06 DIAGNOSIS — Z78.9 TAKES DIETARY SUPPLEMENTS: ICD-10-CM

## 2024-05-06 RX ORDER — CHLORAL HYDRATE 500 MG
1 CAPSULE ORAL 2 TIMES DAILY
Qty: 180 CAPSULE | Refills: 97 | Status: SHIPPED | OUTPATIENT
Start: 2024-05-06

## 2024-05-13 VITALS
HEIGHT: 66 IN | HEART RATE: 69 BPM | OXYGEN SATURATION: 96 % | DIASTOLIC BLOOD PRESSURE: 73 MMHG | SYSTOLIC BLOOD PRESSURE: 125 MMHG | TEMPERATURE: 96.8 F | BODY MASS INDEX: 29.15 KG/M2 | RESPIRATION RATE: 14 BRPM | WEIGHT: 181.4 LBS

## 2024-05-13 NOTE — PROGRESS NOTES
"[facility]  [place] Medical Record Number:  [id number]  Place of Service where encounter took place:  [facility] [[id number]]  Chief Complaint   Patient presents with    Assisted Living Acute     anxiety       HPI:    Kim Herrera  is a 68 year old (1955), who is being seen today for an episodic care visit.  HPI information obtained from: facility chart records, facility staff, patient report, and [facility] chart review. Today's concern is:    Follow up to ongoing decline. Unclear if acute on chronic issue with accelerated change. Today she is bouncing her right knee and appears more anxious. She is not making eye contract with me, says \"I don't know what to do\". Has a history of UTIs and is on chronic antibiotic suppression. Not able to give any more hpi. Staff reporting failed DEVYN this past weekend with increased agitation.    Past Medical and Surgical History reviewed in Epic today.    MEDICATIONS:    Current Outpatient Medications   Medication Sig Dispense Refill    Calcium Carb-Cholecalciferol (CALCIUM-VITAMIN D3) 600-12.5 MG-MCG CAPS Take 1 tablet by mouth 2 times daily 60 capsule 11    cyanocobalamin (VITAMIN B-12) 1000 MCG tablet Take 1 tablet (1,000 mcg) by mouth daily 30 tablet 11    donepezil (ARICEPT) 10 MG tablet Take 1 tablet (10 mg) by mouth daily Take with 5 mg for a total of 15 mg 30 tablet 11    donepezil (ARICEPT) 5 MG tablet Take 1 tablet (5 mg) by mouth daily Take with 10 mg for a total of 15 mg 90 tablet 3    escitalopram (LEXAPRO) 5 MG tablet Take 1 tablet (5 mg) by mouth daily 30 tablet 11    fish oil-omega-3 fatty acids 1000 MG capsule TAKE 1 CAPSULE BY MOUTH TWICE DAILY 180 capsule 97    hydrOXYzine (ATARAX) 25 MG tablet Take 12.5 mg by mouth every 6 hours as needed for anxiety      memantine (NAMENDA) 10 MG tablet Take 1 tablet (10 mg) by mouth 2 times daily 30 tablet 11    nitroFURantoin macrocrystal (MACRODANTIN) 100 MG capsule Take " "1 capsule (100 mg) by mouth daily 90 capsule 3    Vitamin D3 (CHOLECALCIFEROL) 25 mcg (1000 units) tablet Take 1 tablet (25 mcg) by mouth daily 30 tablet 11     REVIEW OF SYSTEMS:  Unobtainable secondary to cognitive impairment.     Objective:  /73   Pulse 69   Temp 96.8  F (36  C)   Resp 14   Ht 1.676 m (5' 6\")   Wt 82.3 kg (181 lb 6.4 oz)   SpO2 96%   BMI 29.28 kg/m    Exam:  GENERAL APPEARANCE:  Alert, in no distress  ENT:  Mouth and posterior oropharynx normal, moist mucous membranes, normal hearing acuity  EYES:  EOM, conjunctivae, lids, pupils and irises normal but distant gaze, tracking ok  RESP:  lungs clear to auscultation   CV:  regular rate and rhythm, no murmur, rub, or gallop, no edema  ABDOMEN:  no guarding or rebound  M/S:   Gait and station normal  SKIN: intact to visualized areas but limited exam   NEURO:   Cranial nerves 2-12 are normal tested and grossly at patient's baseline  PSYCH:  insight and judgement impaired, memory     Labs:   CBC RESULTS:   Recent Labs   Lab Test 11/14/23  0600   WBC 5.0   RBC 4.55   HGB 14.0   HCT 42.7   MCV 94   MCH 30.8   MCHC 32.8   RDW 13.1          Last Basic Metabolic Panel:  Recent Labs   Lab Test 11/14/23  0600      POTASSIUM 3.8   CHLORIDE 106   NERIS 9.2   CO2 22   BUN 11.4   CR 0.76   GLC 90       Liver Function Studies -   Recent Labs   Lab Test 11/14/23  0600   PROTTOTAL 6.8   ALBUMIN 3.8   BILITOTAL 0.3   ALKPHOS 67   AST 22   ALT 16       TSH   Date Value Ref Range Status   11/14/2023 1.96 0.30 - 4.20 uIU/mL Final       No results found for: \"A1C\"    ASSESSMENT/PLAN:  (F41.9) Anxiety disorder, unspecified type  (primary encounter diagnosis)  (R53.81) Declining functional status  Comment: marked change of status from previous visit   Plan:   -UA/UC for UTI  -BMP and CBC, Liver Function Tests  for fluid electrolyte imbalance   -TSH for hypothyroidism      Electronically signed by:  Ifeanyi Chandler, REI CNP           "

## 2024-05-14 ENCOUNTER — ASSISTED LIVING VISIT (OUTPATIENT)
Dept: GERIATRICS | Facility: CLINIC | Age: 69
End: 2024-05-14
Payer: MEDICARE

## 2024-05-14 DIAGNOSIS — R53.81 DECLINING FUNCTIONAL STATUS: ICD-10-CM

## 2024-05-14 DIAGNOSIS — F41.9 ANXIETY DISORDER, UNSPECIFIED TYPE: Primary | ICD-10-CM

## 2024-05-14 PROCEDURE — 99349 HOME/RES VST EST MOD MDM 40: CPT | Performed by: NURSE PRACTITIONER

## 2024-05-14 NOTE — LETTER
Kim Herrera orders:     -UA/UC for UTI      -BMP and CBC, Liver Function Tests  for fluid electrolyte imbalance     -TSH for hypothyroidism      Ifeanyi Chandler, APRN CNP on 5/14/2024 at 11:50 AM

## 2024-05-14 NOTE — LETTER
"    5/14/2024        RE: Kim Herrera  C/o Herbie Herrera  [address] [facility] [address]        [place] [facility]  [place] Medical Record Number:  [id number]  Place of Service where encounter took place:  [facility] [[id number]]  Chief Complaint   Patient presents with     Assisted Living Acute     anxiety       HPI:    Kim Herrera  is a 68 year old (1955), who is being seen today for an episodic care visit.  HPI information obtained from: facility chart records, facility staff, patient report, and [facility] chart review. Today's concern is:    Follow up to ongoing decline. Unclear if acute on chronic issue with accelerated change. Today she is bouncing her right knee and appears more anxious. She is not making eye contract with me, says \"I don't know what to do\". Has a history of UTIs and is on chronic antibiotic suppression. Not able to give any more hpi. Staff reporting failed DEVYN this past weekend with increased agitation.    Past Medical and Surgical History reviewed in Epic today.    MEDICATIONS:    Current Outpatient Medications   Medication Sig Dispense Refill     Calcium Carb-Cholecalciferol (CALCIUM-VITAMIN D3) 600-12.5 MG-MCG CAPS Take 1 tablet by mouth 2 times daily 60 capsule 11     cyanocobalamin (VITAMIN B-12) 1000 MCG tablet Take 1 tablet (1,000 mcg) by mouth daily 30 tablet 11     donepezil (ARICEPT) 10 MG tablet Take 1 tablet (10 mg) by mouth daily Take with 5 mg for a total of 15 mg 30 tablet 11     donepezil (ARICEPT) 5 MG tablet Take 1 tablet (5 mg) by mouth daily Take with 10 mg for a total of 15 mg 90 tablet 3     escitalopram (LEXAPRO) 5 MG tablet Take 1 tablet (5 mg) by mouth daily 30 tablet 11     fish oil-omega-3 fatty acids 1000 MG capsule TAKE 1 CAPSULE BY MOUTH TWICE DAILY 180 capsule 97     hydrOXYzine (ATARAX) 25 MG tablet Take 12.5 mg by mouth every 6 hours as needed for anxiety       memantine (NAMENDA) 10 MG tablet Take 1 " "tablet (10 mg) by mouth 2 times daily 30 tablet 11     nitroFURantoin macrocrystal (MACRODANTIN) 100 MG capsule Take 1 capsule (100 mg) by mouth daily 90 capsule 3     Vitamin D3 (CHOLECALCIFEROL) 25 mcg (1000 units) tablet Take 1 tablet (25 mcg) by mouth daily 30 tablet 11     REVIEW OF SYSTEMS:  Unobtainable secondary to cognitive impairment.     Objective:  /73   Pulse 69   Temp 96.8  F (36  C)   Resp 14   Ht 1.676 m (5' 6\")   Wt 82.3 kg (181 lb 6.4 oz)   SpO2 96%   BMI 29.28 kg/m    Exam:  GENERAL APPEARANCE:  Alert, in no distress  ENT:  Mouth and posterior oropharynx normal, moist mucous membranes, normal hearing acuity  EYES:  EOM, conjunctivae, lids, pupils and irises normal but distant gaze, tracking ok  RESP:  lungs clear to auscultation   CV:  regular rate and rhythm, no murmur, rub, or gallop, no edema  ABDOMEN:  no guarding or rebound  M/S:   Gait and station normal  SKIN: intact to visualized areas but limited exam   NEURO:   Cranial nerves 2-12 are normal tested and grossly at patient's baseline  PSYCH:  insight and judgement impaired, memory     Labs:   CBC RESULTS:   Recent Labs   Lab Test 11/14/23  0600   WBC 5.0   RBC 4.55   HGB 14.0   HCT 42.7   MCV 94   MCH 30.8   MCHC 32.8   RDW 13.1          Last Basic Metabolic Panel:  Recent Labs   Lab Test 11/14/23  0600      POTASSIUM 3.8   CHLORIDE 106   NERIS 9.2   CO2 22   BUN 11.4   CR 0.76   GLC 90       Liver Function Studies -   Recent Labs   Lab Test 11/14/23  0600   PROTTOTAL 6.8   ALBUMIN 3.8   BILITOTAL 0.3   ALKPHOS 67   AST 22   ALT 16       TSH   Date Value Ref Range Status   11/14/2023 1.96 0.30 - 4.20 uIU/mL Final       No results found for: \"A1C\"    ASSESSMENT/PLAN:  (F41.9) Anxiety disorder, unspecified type  (primary encounter diagnosis)  (R53.81) Declining functional status  Comment: marked change of status from previous visit   Plan:   -UA/UC for UTI  -BMP and CBC, Liver Function Tests  for fluid electrolyte " imbalance   -TSH for hypothyroidism      Electronically signed by:  REI Pino CNP             Sincerely,        REI Pino CNP

## 2024-05-15 ENCOUNTER — LAB REQUISITION (OUTPATIENT)
Dept: LAB | Facility: CLINIC | Age: 69
End: 2024-05-15
Payer: MEDICARE

## 2024-05-15 DIAGNOSIS — R41.82 ALTERED MENTAL STATUS, UNSPECIFIED: ICD-10-CM

## 2024-05-15 DIAGNOSIS — E03.9 HYPOTHYROIDISM, UNSPECIFIED: ICD-10-CM

## 2024-05-15 PROCEDURE — 87086 URINE CULTURE/COLONY COUNT: CPT | Mod: ORL | Performed by: NURSE PRACTITIONER

## 2024-05-15 PROCEDURE — 81001 URINALYSIS AUTO W/SCOPE: CPT | Mod: ORL | Performed by: NURSE PRACTITIONER

## 2024-05-16 ENCOUNTER — LAB REQUISITION (OUTPATIENT)
Dept: LAB | Facility: CLINIC | Age: 69
End: 2024-05-16
Payer: MEDICARE

## 2024-05-16 DIAGNOSIS — R41.82 ALTERED MENTAL STATUS, UNSPECIFIED: ICD-10-CM

## 2024-05-16 LAB
ALBUMIN UR-MCNC: 10 MG/DL
APPEARANCE UR: ABNORMAL
BACTERIA #/AREA URNS HPF: ABNORMAL /HPF
BILIRUB UR QL STRIP: NEGATIVE
CAOX CRY #/AREA URNS HPF: ABNORMAL /HPF
COLOR UR AUTO: YELLOW
GLUCOSE UR STRIP-MCNC: NEGATIVE MG/DL
HGB UR QL STRIP: NEGATIVE
KETONES UR STRIP-MCNC: NEGATIVE MG/DL
LEUKOCYTE ESTERASE UR QL STRIP: ABNORMAL
MUCOUS THREADS #/AREA URNS LPF: PRESENT /LPF
NITRATE UR QL: NEGATIVE
PH UR STRIP: 6 [PH] (ref 5–7)
RBC URINE: 6 /HPF
SP GR UR STRIP: 1.02 (ref 1–1.03)
SQUAMOUS EPITHELIAL: 14 /HPF
UROBILINOGEN UR STRIP-MCNC: NORMAL MG/DL
WBC URINE: 103 /HPF

## 2024-05-17 ENCOUNTER — LAB REQUISITION (OUTPATIENT)
Dept: LAB | Facility: CLINIC | Age: 69
End: 2024-05-17
Payer: MEDICARE

## 2024-05-17 DIAGNOSIS — R41.82 ALTERED MENTAL STATUS, UNSPECIFIED: ICD-10-CM

## 2024-05-17 LAB
ALBUMIN UR-MCNC: NEGATIVE MG/DL
APPEARANCE UR: CLEAR
BACTERIA UR CULT: NORMAL
BILIRUB UR QL STRIP: NEGATIVE
COLOR UR AUTO: ABNORMAL
GLUCOSE UR STRIP-MCNC: NEGATIVE MG/DL
HGB UR QL STRIP: NEGATIVE
KETONES UR STRIP-MCNC: NEGATIVE MG/DL
LEUKOCYTE ESTERASE UR QL STRIP: ABNORMAL
NITRATE UR QL: NEGATIVE
PH UR STRIP: 5.5 [PH] (ref 5–7)
SP GR UR STRIP: 1.02 (ref 1–1.03)
UROBILINOGEN UR STRIP-MCNC: NORMAL MG/DL

## 2024-05-17 PROCEDURE — 81003 URINALYSIS AUTO W/O SCOPE: CPT | Mod: ORL | Performed by: NURSE PRACTITIONER

## 2024-05-17 PROCEDURE — 87086 URINE CULTURE/COLONY COUNT: CPT | Mod: ORL | Performed by: NURSE PRACTITIONER

## 2024-05-19 LAB — BACTERIA UR CULT: NORMAL

## 2024-05-21 LAB
ALBUMIN SERPL BCG-MCNC: 4.3 G/DL (ref 3.5–5.2)
ALP SERPL-CCNC: 71 U/L (ref 40–150)
ALT SERPL W P-5'-P-CCNC: 16 U/L (ref 0–50)
ANION GAP SERPL CALCULATED.3IONS-SCNC: 14 MMOL/L (ref 7–15)
AST SERPL W P-5'-P-CCNC: 21 U/L (ref 0–45)
BILIRUB DIRECT SERPL-MCNC: <0.2 MG/DL (ref 0–0.3)
BILIRUB SERPL-MCNC: 0.2 MG/DL
BUN SERPL-MCNC: 12.4 MG/DL (ref 8–23)
CALCIUM SERPL-MCNC: 9.7 MG/DL (ref 8.8–10.2)
CHLORIDE SERPL-SCNC: 105 MMOL/L (ref 98–107)
CREAT SERPL-MCNC: 0.71 MG/DL (ref 0.51–0.95)
DEPRECATED HCO3 PLAS-SCNC: 22 MMOL/L (ref 22–29)
EGFRCR SERPLBLD CKD-EPI 2021: >90 ML/MIN/1.73M2
ERYTHROCYTE [DISTWIDTH] IN BLOOD BY AUTOMATED COUNT: 13.2 % (ref 10–15)
GLUCOSE SERPL-MCNC: 68 MG/DL (ref 70–99)
HCT VFR BLD AUTO: 43.2 % (ref 35–47)
HGB BLD-MCNC: 14.3 G/DL (ref 11.7–15.7)
MCH RBC QN AUTO: 30.9 PG (ref 26.5–33)
MCHC RBC AUTO-ENTMCNC: 33.1 G/DL (ref 31.5–36.5)
MCV RBC AUTO: 93 FL (ref 78–100)
PLATELET # BLD AUTO: 232 10E3/UL (ref 150–450)
POTASSIUM SERPL-SCNC: 3.8 MMOL/L (ref 3.4–5.3)
PROT SERPL-MCNC: 7.4 G/DL (ref 6.4–8.3)
RBC # BLD AUTO: 4.63 10E6/UL (ref 3.8–5.2)
SODIUM SERPL-SCNC: 141 MMOL/L (ref 135–145)
TSH SERPL DL<=0.005 MIU/L-ACNC: 1.01 UIU/ML (ref 0.3–4.2)
WBC # BLD AUTO: 6.5 10E3/UL (ref 4–11)

## 2024-05-21 PROCEDURE — P9604 ONE-WAY ALLOW PRORATED TRIP: HCPCS | Mod: ORL | Performed by: NURSE PRACTITIONER

## 2024-05-21 PROCEDURE — 82248 BILIRUBIN DIRECT: CPT | Mod: ORL | Performed by: NURSE PRACTITIONER

## 2024-05-21 PROCEDURE — 85027 COMPLETE CBC AUTOMATED: CPT | Mod: ORL | Performed by: NURSE PRACTITIONER

## 2024-05-21 PROCEDURE — 84443 ASSAY THYROID STIM HORMONE: CPT | Mod: ORL | Performed by: NURSE PRACTITIONER

## 2024-05-21 PROCEDURE — 36415 COLL VENOUS BLD VENIPUNCTURE: CPT | Mod: ORL | Performed by: NURSE PRACTITIONER

## 2024-05-21 PROCEDURE — 80053 COMPREHEN METABOLIC PANEL: CPT | Mod: ORL | Performed by: NURSE PRACTITIONER

## 2024-05-28 ENCOUNTER — ASSISTED LIVING VISIT (OUTPATIENT)
Dept: GERIATRICS | Facility: CLINIC | Age: 69
End: 2024-05-28
Payer: MEDICARE

## 2024-05-28 VITALS
HEIGHT: 66 IN | HEART RATE: 69 BPM | TEMPERATURE: 96.8 F | BODY MASS INDEX: 29.15 KG/M2 | SYSTOLIC BLOOD PRESSURE: 125 MMHG | OXYGEN SATURATION: 96 % | RESPIRATION RATE: 14 BRPM | DIASTOLIC BLOOD PRESSURE: 73 MMHG | WEIGHT: 181.4 LBS

## 2024-05-28 DIAGNOSIS — F41.9 ANXIETY DISORDER, UNSPECIFIED TYPE: ICD-10-CM

## 2024-05-28 DIAGNOSIS — G30.0 MILD EARLY ONSET ALZHEIMER'S DEMENTIA WITH OTHER BEHAVIORAL DISTURBANCE (H): Primary | ICD-10-CM

## 2024-05-28 DIAGNOSIS — F02.A18 MILD EARLY ONSET ALZHEIMER'S DEMENTIA WITH OTHER BEHAVIORAL DISTURBANCE (H): Primary | ICD-10-CM

## 2024-05-28 PROCEDURE — 99349 HOME/RES VST EST MOD MDM 40: CPT | Performed by: NURSE PRACTITIONER

## 2024-05-28 NOTE — LETTER
5/28/2024        RE: Kim Herrera  C/o Herbie Herrera  [address] [facility] [address]        [place] [facility]  [place] Medical Record Number:  [id number]  Place of Service where encounter took place:  [facility] [[id number]]  Chief Complaint   Patient presents with     Assisted Living Acute     anxiety       HPI:    Kim Herrera  is a 68 year old (1955), who is being seen today for an episodic care visit.  HPI information obtained from: facility chart records, facility staff, patient report, and [facility] chart review. Today's concern is:    Continues with ongoing anxiety and some agitation. She is bouncing her right knee, nonsensical with conversation. 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 When they walk away to assist others she is upset. She is on escitalopram and prn atarax which is used 4 times this month.     Past Medical and Surgical History reviewed in Epic today.    MEDICATIONS:    Current Outpatient Medications   Medication Sig Dispense Refill     Calcium Carb-Cholecalciferol (CALCIUM-VITAMIN D3) 600-12.5 MG-MCG CAPS Take 1 tablet by mouth 2 times daily 60 capsule 11     cyanocobalamin (VITAMIN B-12) 1000 MCG tablet Take 1 tablet (1,000 mcg) by mouth daily 30 tablet 11     donepezil (ARICEPT) 10 MG tablet Take 1 tablet (10 mg) by mouth daily Take with 5 mg for a total of 15 mg 30 tablet 11     donepezil (ARICEPT) 5 MG tablet Take 1 tablet (5 mg) by mouth daily Take with 10 mg for a total of 15 mg 90 tablet 3     escitalopram (LEXAPRO) 5 MG tablet Take 1 tablet (5 mg) by mouth daily 30 tablet 11     fish oil-omega-3 fatty acids 1000 MG capsule TAKE 1 CAPSULE BY MOUTH TWICE DAILY 180 capsule 97     hydrOXYzine (ATARAX) 25 MG tablet Take 12.5 mg by mouth every 6 hours as needed for anxiety       memantine (NAMENDA) 10 MG tablet Take 1 tablet (10 mg) by mouth 2 times daily 30 tablet 11      "nitroFURantoin macrocrystal (MACRODANTIN) 100 MG capsule Take 1 capsule (100 mg) by mouth daily 90 capsule 3     Vitamin D3 (CHOLECALCIFEROL) 25 mcg (1000 units) tablet Take 1 tablet (25 mcg) by mouth daily 30 tablet 11     REVIEW OF SYSTEMS:  Limited secondary to cognitive impairment but today pt reports ok    Objective:  /73   Pulse 69   Temp 96.8  F (36  C)   Resp 14   Ht 1.676 m (5' 6\")   Wt 82.3 kg (181 lb 6.4 oz)   SpO2 96%   BMI 29.28 kg/m    Exam:  GENERAL APPEARANCE:  Alert, anxious  ENT:  Mouth and posterior oropharynx normal, moist mucous membranes, normal hearing acuity  EYES:  EOM, conjunctivae, lids, pupils and irises normal but distant gaze with right eye-tracking ok  RESP:  lungs clear to auscultation   CV:  regular rate and rhythm, no murmur, rub, or gallop, no edema  ABDOMEN:  no guarding or rebound  M/S:   Gait and station normal  SKIN: intact to visualized areas but limited exam   NEURO:   Cranial nerves 2-12 are normal tested and grossly at patient's baseline  PSYCH:  insight and judgement impaired, memory     Labs:   CBC RESULTS:   Recent Labs   Lab Test 05/21/24  1113 11/14/23  0600   WBC 6.5 5.0   RBC 4.63 4.55   HGB 14.3 14.0   HCT 43.2 42.7   MCV 93 94   MCH 30.9 30.8   MCHC 33.1 32.8   RDW 13.2 13.1    250       Last Basic Metabolic Panel:  Recent Labs   Lab Test 05/21/24  1113 11/14/23  0600    142   POTASSIUM 3.8 3.8   CHLORIDE 105 106   NERIS 9.7 9.2   CO2 22 22   BUN 12.4 11.4   CR 0.71 0.76   GLC 68* 90       Liver Function Studies -   Recent Labs   Lab Test 05/21/24  1113 11/14/23  0600   PROTTOTAL 7.4 6.8   ALBUMIN 4.3 3.8   BILITOTAL 0.2 0.3   ALKPHOS 71 67   AST 21 22   ALT 16 16       TSH   Date Value Ref Range Status   05/21/2024 1.01 0.30 - 4.20 uIU/mL Final   11/14/2023 1.96 0.30 - 4.20 uIU/mL Final       No results found for: \"A1C\"    A(G30.0,  F02.A18) Mild early onset Alzheimer's dementia with other behavioral disturbance (H)  (primary encounter " diagnosis)  (F41.9) Anxiety disorder, unspecified type  Comment: failed higher dose of Aricpet with GI upset. Doing well with transition to . Some increased anxiety at times  Plan:   -Aricept 15 mg po daily reducing to 10 mg po daily today  -namenda 10 mg po BID  -escitalopram 5 mg po daily left message with family. Could increase to 10 mg po daily   -atarax prn            Electronically signed by:  REI Pino CNP             Sincerely,        REI Pino CNP

## 2024-05-28 NOTE — PROGRESS NOTES
[place] [facility]  [place] Medical Record Number:  [id number]  Place of Service where encounter took place:  [facility] [[id number]]  Chief Complaint   Patient presents with    Assisted Living Acute     anxiety       HPI:    Kim Herrera  is a 68 year old (1955), who is being seen today for an episodic care visit.  HPI information obtained from: facility chart records, facility staff, patient report, and [facility] chart review. Today's concern is:    Continues with ongoing anxiety and some agitation. She is bouncing her right knee, nonsensical with conversation. 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 When they walk away to assist others she is upset. She is on escitalopram and prn atarax which is used 4 times this month.     Past Medical and Surgical History reviewed in Epic today.    MEDICATIONS:    Current Outpatient Medications   Medication Sig Dispense Refill    Calcium Carb-Cholecalciferol (CALCIUM-VITAMIN D3) 600-12.5 MG-MCG CAPS Take 1 tablet by mouth 2 times daily 60 capsule 11    cyanocobalamin (VITAMIN B-12) 1000 MCG tablet Take 1 tablet (1,000 mcg) by mouth daily 30 tablet 11    donepezil (ARICEPT) 10 MG tablet Take 1 tablet (10 mg) by mouth daily Take with 5 mg for a total of 15 mg 30 tablet 11    donepezil (ARICEPT) 5 MG tablet Take 1 tablet (5 mg) by mouth daily Take with 10 mg for a total of 15 mg 90 tablet 3    escitalopram (LEXAPRO) 5 MG tablet Take 1 tablet (5 mg) by mouth daily 30 tablet 11    fish oil-omega-3 fatty acids 1000 MG capsule TAKE 1 CAPSULE BY MOUTH TWICE DAILY 180 capsule 97    hydrOXYzine (ATARAX) 25 MG tablet Take 12.5 mg by mouth every 6 hours as needed for anxiety      memantine (NAMENDA) 10 MG tablet Take 1 tablet (10 mg) by mouth 2 times daily 30 tablet 11    nitroFURantoin macrocrystal (MACRODANTIN) 100 MG capsule Take 1 capsule (100 mg) by mouth daily 90 capsule 3    Vitamin D3  "(CHOLECALCIFEROL) 25 mcg (1000 units) tablet Take 1 tablet (25 mcg) by mouth daily 30 tablet 11     REVIEW OF SYSTEMS:  Limited secondary to cognitive impairment but today pt reports ok    Objective:  /73   Pulse 69   Temp 96.8  F (36  C)   Resp 14   Ht 1.676 m (5' 6\")   Wt 82.3 kg (181 lb 6.4 oz)   SpO2 96%   BMI 29.28 kg/m    Exam:  GENERAL APPEARANCE:  Alert, anxious  ENT:  Mouth and posterior oropharynx normal, moist mucous membranes, normal hearing acuity  EYES:  EOM, conjunctivae, lids, pupils and irises normal but distant gaze with right eye-tracking ok  RESP:  lungs clear to auscultation   CV:  regular rate and rhythm, no murmur, rub, or gallop, no edema  ABDOMEN:  no guarding or rebound  M/S:   Gait and station normal  SKIN: intact to visualized areas but limited exam   NEURO:   Cranial nerves 2-12 are normal tested and grossly at patient's baseline  PSYCH:  insight and judgement impaired, memory     Labs:   CBC RESULTS:   Recent Labs   Lab Test 05/21/24  1113 11/14/23  0600   WBC 6.5 5.0   RBC 4.63 4.55   HGB 14.3 14.0   HCT 43.2 42.7   MCV 93 94   MCH 30.9 30.8   MCHC 33.1 32.8   RDW 13.2 13.1    250       Last Basic Metabolic Panel:  Recent Labs   Lab Test 05/21/24  1113 11/14/23  0600    142   POTASSIUM 3.8 3.8   CHLORIDE 105 106   NERIS 9.7 9.2   CO2 22 22   BUN 12.4 11.4   CR 0.71 0.76   GLC 68* 90       Liver Function Studies -   Recent Labs   Lab Test 05/21/24  1113 11/14/23  0600   PROTTOTAL 7.4 6.8   ALBUMIN 4.3 3.8   BILITOTAL 0.2 0.3   ALKPHOS 71 67   AST 21 22   ALT 16 16       TSH   Date Value Ref Range Status   05/21/2024 1.01 0.30 - 4.20 uIU/mL Final   11/14/2023 1.96 0.30 - 4.20 uIU/mL Final       No results found for: \"A1C\"    A(G30.0,  F02.A18) 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 Doing well with " transition to . Some increased anxiety at times  Plan:   -Aricept 15 mg po daily reducing to 10 mg po daily today  -namenda 10 mg po BID  -escitalopram 5 mg po daily left message with family. Could increase to 10 mg po daily   -atarax prn            Electronically signed by:  REI Pino CNP

## 2024-05-28 NOTE — LETTER
Kim Herrera orders:     -reduce Aricpet to 10 mg po daily       Ifeanyi Chandler, REI CNP on 5/28/2024 at 4:12 PM

## 2024-06-27 DIAGNOSIS — F41.9 ANXIETY: Primary | ICD-10-CM

## 2024-06-27 RX ORDER — HYDROXYZINE HYDROCHLORIDE 25 MG/1
TABLET, FILM COATED ORAL
Qty: 15 TABLET | Refills: 11 | Status: SHIPPED | OUTPATIENT
Start: 2024-06-27

## 2024-06-28 ENCOUNTER — TELEPHONE (OUTPATIENT)
Dept: GERIATRICS | Facility: CLINIC | Age: 69
End: 2024-06-28
Payer: MEDICARE

## 2024-06-28 NOTE — TELEPHONE ENCOUNTER
Prior Authorization Retail Medication Request    Medication/Dose: HYDROXYZ HCL TAB 25MG  Diagnosis and ICD code (if different than what is on RX):  F41.9  New/renewal/insurance change PA/secondary ins. PA:  Previously Tried and Failed:  Escitalopram; failed Aricept - GI upset; Namenda  Rationale:  Take 12.5mg by mouth every 6 hours as needed for anxiety        Insurance   Primary: MEDICARE  Insurance ID:  [id number]    Secondary (if applicable):  Insurance ID:      Pharmacy Information (if different than what is on RX)  Name:  [facility]  Phone:  [phone]  Fax: [phone]

## 2024-07-02 NOTE — TELEPHONE ENCOUNTER
Central Prior Authorization Team   Phone: [phone]    PA Initiation    Medication: HYDROXYZ HCL TAB 25MG  Insurance Company: [employer] - Phone [phone] Fax [phone]  Pharmacy Filling the Rx: [facility] - [place], [place] - [address] [place] [address]  Filling Pharmacy Phone: [phone]  Filling Pharmacy Fax:    Start Date: 7/2/2024

## 2024-07-02 NOTE — TELEPHONE ENCOUNTER
Prior Authorization Approval    Authorization Effective Date: 4/3/2024  Authorization Expiration Date: 7/2/2025  Medication: HYDROXYZ HCL TAB 25MG  Approved Dose/Quantity:   Reference #:     Insurance Company: [employer] - Phone [phone] Fax [phone]  Expected CoPay:       CoPay Card Available:      Foundation Assistance Needed:    Which Pharmacy is filling the prescription (Not needed for infusion/clinic administered): [facility] - [address]  Pharmacy Notified:  yes  Patient Notified:  yes- Pharmacy will contact patient when ready to /ship

## 2024-07-22 RX ORDER — DONEPEZIL HYDROCHLORIDE 10 MG/1
10 TABLET, FILM COATED ORAL DAILY
COMMUNITY

## 2024-07-22 NOTE — PROGRESS NOTES
[facility]  Chief Complaint   Patient presents with    Annual Comprehensive Exam Assisted Living     [facility] Medical Record Number:  [id number]  Place of Service where encounter took place:  [facility] [[id number]]    HPI:    Kim Herrera  is a 68 year old  (1955), who is being seen today for an annual comprehensive visit. HPI information obtained from: facility chart records, facility staff, and [facility] Epic chart review.  Today's concerns are:    Seen today for follow up to chronic disease management. Ongoing anxiety, sleeping more. Bouncing the right knee has been going on for years according to her . 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 When they walk away to assist others she is upset. She is on escitalopram and prn atarax which is used 4 times this month. Aricept was reduced at previous visit to help with pacing and agitation which continues.     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  PAST SURGICAL HISTORY:  has a past surgical history that includes appendectomy and Replacement Total Knee (Right).  IMMUNIZATIONS:  Immunization History   Administered Date(s) Administered    COVID-19 12+ (2023-24) (MODERNA) 10/30/2023    COVID-19 Monovalent 18+ (Moderna) 01/04/2021, 02/01/2021, 10/22/2021    Influenza Vaccine 65+ (Fluzone HD) 10/26/2023    Influenza Vaccine >6 months,quad, PF 10/21/2021    Influenza Vaccine, 6+MO IM (QUADRIVALENT W/PRESERVATIVES)  11/16/2022    Pneumococcal 20 valent Conjugate (Prevnar 20) 10/30/2023    Pneumococcal 23 valent 06/22/2021    RSV Vaccine (Abrysvo) 10/26/2023    TD,PF 7+ (Tenivac) 01/16/2019    TDAP (Adacel,Boostrix) 10/06/2008    Td (Adult), Adsorbed 10/12/2005    Zoster vaccine, live 12/29/2011     Above immunizations pulled from [facility]. [facility] and facility records also reconciled. Outstanding information sent to  to update [facility].  Future immunizations needed:  Shingrix but family will discuss/ consider.     Current Outpatient Medications   Medication Sig Dispense Refill    Calcium Carb-Cholecalciferol (CALCIUM-VITAMIN D3) 600-12.5 MG-MCG CAPS Take 1 tablet by mouth 2 times daily 60 capsule 11    donepezil (ARICEPT) 10 MG tablet Take 10 mg by mouth daily      escitalopram (LEXAPRO) 5 MG tablet Take 1 tablet (5 mg) by mouth daily 30 tablet 11    fish oil-omega-3 fatty acids 1000 MG capsule TAKE 1 CAPSULE BY MOUTH TWICE DAILY 180 capsule 97    hydrOXYzine HCl (ATARAX) 25 MG tablet TAKE ONE-HALF TABLET (12.5 MG) BY MOUTH EVERY 6 HOURS AS NEEDED FOR ANXIETY 15 tablet 11    memantine (NAMENDA) 10 MG tablet Take 1 tablet (10 mg) by mouth 2 times daily 30 tablet 11    nitroFURantoin macrocrystal (MACRODANTIN) 100 MG capsule Take 1 capsule (100 mg) by mouth daily 90 capsule 3    Vitamin D3 (CHOLECALCIFEROL) 25 mcg (1000 units) tablet Take 1 tablet (25 mcg) by mouth daily 30 tablet 11    cyanocobalamin (VITAMIN B-12) 1000 MCG tablet Take 1 tablet (1,000 mcg) by mouth daily 30 tablet 11    donepezil (ARICEPT) 10 MG tablet Take 1 tablet (10 mg) by mouth daily Take with 5 mg for a total of 15 mg 30 tablet 11     Case Management:  I have reviewed the Assisted Living care plan, current immunizations and preventive care/cancer screening. . Family  ok for FIT test  Patient's desire to return to the community is not assessible due to cognitive impairment. Current Level of Care is appropriate.    Advance Directive  "Discussion:    I reviewed the current advanced directives as reflected in EPIC, the POLST and the facility chart, and verified the congruency of orders. I contacted the first party  and discussed the plan of Care.  I did not due to cognitive impairment review the advance directives with the resident.     Team Discussion:  I communicated with the appropriate disciplines involved with the Plan of Care:   Nursing    Patient's goal is pain control and comfort.  Information reviewed:  Medications, vital signs, orders, and nursing notes.    ROS:  Unobtainable secondary to cognitive impairment.     Vitals:  /47   Pulse 72   Temp 98  F (36.7  C)   Resp 22   Ht 1.676 m (5' 6\")   Wt 82.1 kg (181 lb)   SpO2 93%   BMI 29.21 kg/m   Body mass index is 29.21 kg/m .  Exam:  GENERAL APPEARANCE:  Alert, anxious, knee bouncing and long history of   ENT:  Mouth and posterior oropharynx normal, moist mucous membranes, normal hearing acuity  EYES:  EOM, conjunctivae, lids, pupils and irises normal but distant gaze with right eye-tracking ok  RESP:  lungs clear to auscultation   CV:  regular rate and rhythm, no murmur, rub, or gallop, no edema  ABDOMEN:  no guarding or rebound  M/S:   Gait and station normal  SKIN: intact to visualized areas but limited exam   NEURO:   Cranial nerves 2-12 are normal tested and grossly at patient's baseline  PSYCH:  insight and judgement impaired, memory        Lab/Diagnostic data:   CBC RESULTS:   Recent Labs   Lab Test 05/21/24  1113 11/14/23  0600   WBC 6.5 5.0   RBC 4.63 4.55   HGB 14.3 14.0   HCT 43.2 42.7   MCV 93 94   MCH 30.9 30.8   MCHC 33.1 32.8   RDW 13.2 13.1    250       Last Basic Metabolic Panel:  Recent Labs   Lab Test 05/21/24  1113 11/14/23  0600    142   POTASSIUM 3.8 3.8   CHLORIDE 105 106   NERIS 9.7 9.2   CO2 22 22   BUN 12.4 11.4   CR 0.71 0.76   GLC 68* 90       Liver Function Studies -   Recent Labs   Lab Test 05/21/24  1113 11/14/23  0600   PROTTOTAL " "7.4 6.8   ALBUMIN 4.3 3.8   BILITOTAL 0.2 0.3   ALKPHOS 71 67   AST 21 22   ALT 16 16       TSH   Date Value Ref Range Status   05/21/2024 1.01 0.30 - 4.20 uIU/mL Final   11/14/2023 1.96 0.30 - 4.20 uIU/mL Final       No results found for: \"A1C\"    ASSESSMENT/PLAN  (G30.0,  F02.A18) Mild early onset Alzheimer's dementia with other behavioral disturbance (H)  (primary encounter diagnosis)  (F41.9) Anxiety disorder, unspecified type  Comment: ongoing with pacing, wandering, crying   Plan:   -increase celexa to 10 mg po daily  -aricept at 10 mg po daily  -atarax prn   -namenda 10 mg po BID    (Z13.6) Screening for hypertension  Comment: BP on lower side-could be SE from aricpet   Plan:   -monitor monthly per facility           Electronically signed by:  REI Pino CNP       "

## 2024-07-23 ENCOUNTER — ASSISTED LIVING VISIT (OUTPATIENT)
Dept: GERIATRICS | Facility: CLINIC | Age: 69
End: 2024-07-23
Payer: MEDICARE

## 2024-07-23 VITALS
WEIGHT: 181 LBS | HEART RATE: 72 BPM | TEMPERATURE: 98 F | OXYGEN SATURATION: 93 % | RESPIRATION RATE: 22 BRPM | DIASTOLIC BLOOD PRESSURE: 47 MMHG | HEIGHT: 66 IN | BODY MASS INDEX: 29.09 KG/M2 | SYSTOLIC BLOOD PRESSURE: 109 MMHG

## 2024-07-23 DIAGNOSIS — F32.A DEPRESSION, UNSPECIFIED DEPRESSION TYPE: ICD-10-CM

## 2024-07-23 DIAGNOSIS — G30.0 MILD EARLY ONSET ALZHEIMER'S DEMENTIA WITH OTHER BEHAVIORAL DISTURBANCE (H): Primary | ICD-10-CM

## 2024-07-23 DIAGNOSIS — F02.A18 MILD EARLY ONSET ALZHEIMER'S DEMENTIA WITH OTHER BEHAVIORAL DISTURBANCE (H): Primary | ICD-10-CM

## 2024-07-23 DIAGNOSIS — F41.9 ANXIETY DISORDER, UNSPECIFIED TYPE: ICD-10-CM

## 2024-07-23 DIAGNOSIS — Z13.6 SCREENING FOR HYPERTENSION: ICD-10-CM

## 2024-07-23 PROCEDURE — 99349 HOME/RES VST EST MOD MDM 40: CPT | Performed by: NURSE PRACTITIONER

## 2024-07-23 RX ORDER — ESCITALOPRAM OXALATE 10 MG/1
10 TABLET ORAL DAILY
Qty: 30 TABLET | Refills: 11 | Status: SHIPPED | OUTPATIENT
Start: 2024-07-23

## 2024-07-23 NOTE — LETTER
7/23/2024      Kim Herrera  C/o Herbie Herrera  [address] [place] [id number]        [facility]  Chief Complaint   Patient presents with     Annual Comprehensive Exam Assisted Living     [facility] Medical Record Number:  [id number]  Place of Service where encounter took place:  [facility] [[id number]]    HPI:    Kim Herrera  is a 68 year old  (1955), who is being seen today for an annual comprehensive visit. HPI information obtained from: facility chart records, facility staff, and [facility] Epic chart review.  Today's concerns are:    Seen today for follow up to chronic disease management. Ongoing anxiety, sleeping more. Bouncing the right knee has been going on for years according to her . 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 When they walk away to assist others she is upset. She is on escitalopram and prn atarax which is used 4 times this month. Aricept was reduced at previous visit to help with pacing and agitation which continues.     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  PAST SURGICAL HISTORY:  has a past surgical history that includes appendectomy and Replacement Total Knee (Right).  IMMUNIZATIONS:  Immunization History   Administered Date(s) Administered     COVID-19 12+ (2023-24) (MODERNA) 10/30/2023     COVID-19 Monovalent 18+ (Moderna) 01/04/2021, 02/01/2021, 10/22/2021     Influenza Vaccine 65+ (Fluzone HD) 10/26/2023      Influenza Vaccine >6 months,quad, PF 10/21/2021     Influenza Vaccine, 6+MO IM (QUADRIVALENT W/PRESERVATIVES) 11/16/2022     Pneumococcal 20 valent Conjugate (Prevnar 20) 10/30/2023     Pneumococcal 23 valent 06/22/2021     RSV Vaccine (Abrysvo) 10/26/2023     TD,PF 7+ (Tenivac) 01/16/2019     TDAP (Adacel,Boostrix) 10/06/2008     Td (Adult), Adsorbed 10/12/2005     Zoster vaccine, live 12/29/2011     Above immunizations pulled from [facility]. MIIC and facility records also reconciled. Outstanding information sent to  to update [facility].  Future immunizations needed:  Shingrix but family will discuss/ consider.     Current Outpatient Medications   Medication Sig Dispense Refill     Calcium Carb-Cholecalciferol (CALCIUM-VITAMIN D3) 600-12.5 MG-MCG CAPS Take 1 tablet by mouth 2 times daily 60 capsule 11     donepezil (ARICEPT) 10 MG tablet Take 10 mg by mouth daily       escitalopram (LEXAPRO) 5 MG tablet Take 1 tablet (5 mg) by mouth daily 30 tablet 11     fish oil-omega-3 fatty acids 1000 MG capsule TAKE 1 CAPSULE BY MOUTH TWICE DAILY 180 capsule 97     hydrOXYzine HCl (ATARAX) 25 MG tablet TAKE ONE-HALF TABLET (12.5 MG) BY MOUTH EVERY 6 HOURS AS NEEDED FOR ANXIETY 15 tablet 11     memantine (NAMENDA) 10 MG tablet Take 1 tablet (10 mg) by mouth 2 times daily 30 tablet 11     nitroFURantoin macrocrystal (MACRODANTIN) 100 MG capsule Take 1 capsule (100 mg) by mouth daily 90 capsule 3     Vitamin D3 (CHOLECALCIFEROL) 25 mcg (1000 units) tablet Take 1 tablet (25 mcg) by mouth daily 30 tablet 11     cyanocobalamin (VITAMIN B-12) 1000 MCG tablet Take 1 tablet (1,000 mcg) by mouth daily 30 tablet 11     donepezil (ARICEPT) 10 MG tablet Take 1 tablet (10 mg) by mouth daily Take with 5 mg for a total of 15 mg 30 tablet 11     Case Management:  I have reviewed the Assisted Living care plan, current immunizations and preventive care/cancer screening. . Family  ok for FIT test  Patient's desire to  "return to the community is not assessible due to cognitive impairment. Current Level of Care is appropriate.    Advance Directive Discussion:    I reviewed the current advanced directives as reflected in EPIC, the POLST and the facility chart, and verified the congruency of orders. I contacted the first party  and discussed the plan of Care.  I did not due to cognitive impairment review the advance directives with the resident.     Team Discussion:  I communicated with the appropriate disciplines involved with the Plan of Care:   Nursing    Patient's goal is pain control and comfort.  Information reviewed:  Medications, vital signs, orders, and nursing notes.    ROS:  Unobtainable secondary to cognitive impairment.     Vitals:  /47   Pulse 72   Temp 98  F (36.7  C)   Resp 22   Ht 1.676 m (5' 6\")   Wt 82.1 kg (181 lb)   SpO2 93%   BMI 29.21 kg/m   Body mass index is 29.21 kg/m .  Exam:  GENERAL APPEARANCE:  Alert, anxious, knee bouncing and long history of   ENT:  Mouth and posterior oropharynx normal, moist mucous membranes, normal hearing acuity  EYES:  EOM, conjunctivae, lids, pupils and irises normal but distant gaze with right eye-tracking ok  RESP:  lungs clear to auscultation   CV:  regular rate and rhythm, no murmur, rub, or gallop, no edema  ABDOMEN:  no guarding or rebound  M/S:   Gait and station normal  SKIN: intact to visualized areas but limited exam   NEURO:   Cranial nerves 2-12 are normal tested and grossly at patient's baseline  PSYCH:  insight and judgement impaired, memory        Lab/Diagnostic data:   CBC RESULTS:   Recent Labs   Lab Test 05/21/24  1113 11/14/23  0600   WBC 6.5 5.0   RBC 4.63 4.55   HGB 14.3 14.0   HCT 43.2 42.7   MCV 93 94   MCH 30.9 30.8   MCHC 33.1 32.8   RDW 13.2 13.1    250       Last Basic Metabolic Panel:  Recent Labs   Lab Test 05/21/24  1113 11/14/23  0600    142   POTASSIUM 3.8 3.8   CHLORIDE 105 106   NERIS 9.7 9.2   CO2 22 22   BUN 12.4 " "11.4   CR 0.71 0.76   GLC 68* 90       Liver Function Studies -   Recent Labs   Lab Test 05/21/24  1113 11/14/23  0600   PROTTOTAL 7.4 6.8   ALBUMIN 4.3 3.8   BILITOTAL 0.2 0.3   ALKPHOS 71 67   AST 21 22   ALT 16 16       TSH   Date Value Ref Range Status   05/21/2024 1.01 0.30 - 4.20 uIU/mL Final   11/14/2023 1.96 0.30 - 4.20 uIU/mL Final       No results found for: \"A1C\"    ASSESSMENT/PLAN  (G30.0,  F02.A18) 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

## 2024-07-23 NOTE — LETTER
Kim johns orders:     -increase to    escitalopram (LEXAPRO) 10 MG tablet Take 1 tablet (10 mg) by mouth daily       Electronically signed by:  REI Pino CNP

## 2024-08-06 ENCOUNTER — LAB (OUTPATIENT)
Dept: LAB | Facility: CLINIC | Age: 69
End: 2024-08-06
Payer: MEDICARE

## 2024-08-06 DIAGNOSIS — Z12.11 SPECIAL SCREENING FOR MALIGNANT NEOPLASMS, COLON: ICD-10-CM

## 2024-08-06 PROCEDURE — 82274 ASSAY TEST FOR BLOOD FECAL: CPT | Performed by: NURSE PRACTITIONER

## 2024-08-12 DIAGNOSIS — Z12.11 SPECIAL SCREENING FOR MALIGNANT NEOPLASMS, COLON: Primary | ICD-10-CM

## 2024-08-12 NOTE — PROGRESS NOTES
"[facility]  [facility] Medical Record Number:  [id number]  Place of Service where encounter took place:  [facility] [[id number]]  Chief Complaint   Patient presents with    RECHECK       HPI:    Kim Herrera  is a 68 year old (1955), who is being seen today for an episodic care visit.  HPI information obtained from: facility chart records, facility staff, patient report, and [facility] chart review. Today's concern is:    Seen today for sleeping more and isolating in her room. She attends many LOAs with her family and wondering if exhausted upon return? Her dementia is also advanced according to staff.  Bouncing the right knee has been going on for years according to her . Delusional but does not seem bothered so will defer on antipsychotics. She is attending an activity today then off for lunch. Says \"Francisco J is with us\". Smiling and bright. Her lexapro was increased 7/24/24. She is sleeping overnight. Atarax prn used 7x last month in the evening and mostly effective to somewhat effective for anxiety. None used this month.     Past Medical and Surgical History reviewed in Epic today.    MEDICATIONS:    Current Outpatient Medications   Medication Sig Dispense Refill    Calcium Carb-Cholecalciferol (CALCIUM-VITAMIN D3) 600-12.5 MG-MCG CAPS Take 1 tablet by mouth 2 times daily 60 capsule 11    donepezil (ARICEPT) 10 MG tablet Take 10 mg by mouth daily      escitalopram (LEXAPRO) 10 MG tablet Take 1 tablet (10 mg) by mouth daily 30 tablet 11    fish oil-omega-3 fatty acids 1000 MG capsule TAKE 1 CAPSULE BY MOUTH TWICE DAILY 180 capsule 97    hydrOXYzine HCl (ATARAX) 25 MG tablet TAKE ONE-HALF TABLET (12.5 MG) BY MOUTH EVERY 6 HOURS AS NEEDED FOR ANXIETY 15 tablet 11    memantine (NAMENDA) 10 MG tablet Take 1 tablet (10 mg) by mouth 2 times daily 30 tablet 11    nitroFURantoin macrocrystal (MACRODANTIN) 100 MG capsule Take 1 capsule (100 mg) by mouth daily 90 " "capsule 3    Vitamin D3 (CHOLECALCIFEROL) 25 mcg (1000 units) tablet Take 1 tablet (25 mcg) by mouth daily 30 tablet 11     REVIEW OF SYSTEMS:  Unobtainable secondary to cognitive impairment.     Objective:  /63   Pulse 69   Temp 97.6  F (36.4  C)   Resp 18   Ht 1.676 m (5' 6\")   Wt 81.9 kg (180 lb 9.6 oz)   SpO2 96%   BMI 29.15 kg/m    Exam:  GENERAL APPEARANCE:  Alert, anxious, knee bouncing and long history of   ENT:  Mouth and posterior oropharynx normal, moist mucous membranes, normal hearing acuity  EYES:  EOM, conjunctivae, lids, pupils and irises normal but distant gaze with right eye-tracking ok  RESP:  lungs clear to auscultation   CV:  regular rate and rhythm, no murmur, rub, or gallop, no edema  ABDOMEN:  no guarding or rebound  M/S:   Gait and station normal-no assistive device needed   SKIN: intact to visualized areas but limited exam   NEURO:   Cranial nerves 2-12 are normal tested and grossly at patient's baseline  PSYCH:  insight and judgement impaired, memory    Labs:   CBC RESULTS:   Recent Labs   Lab Test 05/21/24  1113 11/14/23  0600   WBC 6.5 5.0   RBC 4.63 4.55   HGB 14.3 14.0   HCT 43.2 42.7   MCV 93 94   MCH 30.9 30.8   MCHC 33.1 32.8   RDW 13.2 13.1    250       Last Basic Metabolic Panel:  Recent Labs   Lab Test 05/21/24  1113 11/14/23  0600    142   POTASSIUM 3.8 3.8   CHLORIDE 105 106   NERIS 9.7 9.2   CO2 22 22   BUN 12.4 11.4   CR 0.71 0.76   GLC 68* 90       Liver Function Studies -   Recent Labs   Lab Test 05/21/24  1113 11/14/23  0600   PROTTOTAL 7.4 6.8   ALBUMIN 4.3 3.8   BILITOTAL 0.2 0.3   ALKPHOS 71 67   AST 21 22   ALT 16 16       TSH   Date Value Ref Range Status   05/21/2024 1.01 0.30 - 4.20 uIU/mL Final   11/14/2023 1.96 0.30 - 4.20 uIU/mL Final       No results found for: \"A1C\"    ASSESSMENT/PLAN:  (G30.0,  F02.A18) 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

## 2024-08-13 ENCOUNTER — ASSISTED LIVING VISIT (OUTPATIENT)
Dept: GERIATRICS | Facility: CLINIC | Age: 69
End: 2024-08-13
Payer: MEDICARE

## 2024-08-13 VITALS
BODY MASS INDEX: 29.02 KG/M2 | HEIGHT: 66 IN | HEART RATE: 69 BPM | TEMPERATURE: 97.6 F | RESPIRATION RATE: 18 BRPM | DIASTOLIC BLOOD PRESSURE: 63 MMHG | WEIGHT: 180.6 LBS | OXYGEN SATURATION: 96 % | SYSTOLIC BLOOD PRESSURE: 114 MMHG

## 2024-08-13 DIAGNOSIS — Z87.440 PERSONAL HISTORY OF URINARY TRACT INFECTION: ICD-10-CM

## 2024-08-13 DIAGNOSIS — F41.9 ANXIETY DISORDER, UNSPECIFIED TYPE: ICD-10-CM

## 2024-08-13 DIAGNOSIS — G30.0 MILD EARLY ONSET ALZHEIMER'S DEMENTIA WITH OTHER BEHAVIORAL DISTURBANCE (H): Primary | ICD-10-CM

## 2024-08-13 DIAGNOSIS — F02.A18 MILD EARLY ONSET ALZHEIMER'S DEMENTIA WITH OTHER BEHAVIORAL DISTURBANCE (H): Primary | ICD-10-CM

## 2024-08-13 LAB — HEMOCCULT STL QL IA: NEGATIVE

## 2024-08-13 PROCEDURE — 99349 HOME/RES VST EST MOD MDM 40: CPT | Performed by: NURSE PRACTITIONER

## 2024-08-13 NOTE — LETTER
" 8/13/2024      Kim Herrera  C/o Herbie Herrera  [address] [facility] [address]        [facility]  [facility] Record Number:  [id number]  Place of Service where encounter took place:  [facility] [[id number]]  Chief Complaint   Patient presents with     RECHECK       HPI:    Kim Herrera  is a 68 year old (1955), who is being seen today for an episodic care visit.  HPI information obtained from: facility chart records, facility staff, patient report, and [facility] chart review. Today's concern is:    Seen today for sleeping more and isolating in her room. She attends many LOAs with her family and wondering if exhausted upon return? Her dementia is also advanced according to staff.  Bouncing the right knee has been going on for years according to her . Delusional but does not seem bothered so will defer on antipsychotics. She is attending an activity today then off for lunch. Says \"Francisco J is with us\". Smiling and bright. Her lexapro was increased 7/24/24. She is sleeping overnight. Atarax prn used 7x last month in the evening and mostly effective to somewhat effective for anxiety. None used this month.     Past Medical and Surgical History reviewed in Epic today.    MEDICATIONS:    Current Outpatient Medications   Medication Sig Dispense Refill     Calcium Carb-Cholecalciferol (CALCIUM-VITAMIN D3) 600-12.5 MG-MCG CAPS Take 1 tablet by mouth 2 times daily 60 capsule 11     donepezil (ARICEPT) 10 MG tablet Take 10 mg by mouth daily       escitalopram (LEXAPRO) 10 MG tablet Take 1 tablet (10 mg) by mouth daily 30 tablet 11     fish oil-omega-3 fatty acids 1000 MG capsule TAKE 1 CAPSULE BY MOUTH TWICE DAILY 180 capsule 97     hydrOXYzine HCl (ATARAX) 25 MG tablet TAKE ONE-HALF TABLET (12.5 MG) BY MOUTH EVERY 6 HOURS AS NEEDED FOR ANXIETY 15 tablet 11     memantine (NAMENDA) 10 MG tablet Take 1 tablet (10 mg) by mouth 2 times daily 30 tablet 11 " "    nitroFURantoin macrocrystal (MACRODANTIN) 100 MG capsule Take 1 capsule (100 mg) by mouth daily 90 capsule 3     Vitamin D3 (CHOLECALCIFEROL) 25 mcg (1000 units) tablet Take 1 tablet (25 mcg) by mouth daily 30 tablet 11     REVIEW OF SYSTEMS:  Unobtainable secondary to cognitive impairment.     Objective:  /63   Pulse 69   Temp 97.6  F (36.4  C)   Resp 18   Ht 1.676 m (5' 6\")   Wt 81.9 kg (180 lb 9.6 oz)   SpO2 96%   BMI 29.15 kg/m    Exam:  GENERAL APPEARANCE:  Alert, anxious, knee bouncing and long history of   ENT:  Mouth and posterior oropharynx normal, moist mucous membranes, normal hearing acuity  EYES:  EOM, conjunctivae, lids, pupils and irises normal but distant gaze with right eye-tracking ok  RESP:  lungs clear to auscultation   CV:  regular rate and rhythm, no murmur, rub, or gallop, no edema  ABDOMEN:  no guarding or rebound  M/S:   Gait and station normal-no assistive device needed   SKIN: intact to visualized areas but limited exam   NEURO:   Cranial nerves 2-12 are normal tested and grossly at patient's baseline  PSYCH:  insight and judgement impaired, memory    Labs:   CBC RESULTS:   Recent Labs   Lab Test 05/21/24  1113 11/14/23  0600   WBC 6.5 5.0   RBC 4.63 4.55   HGB 14.3 14.0   HCT 43.2 42.7   MCV 93 94   MCH 30.9 30.8   MCHC 33.1 32.8   RDW 13.2 13.1    250       Last Basic Metabolic Panel:  Recent Labs   Lab Test 05/21/24  1113 11/14/23  0600    142   POTASSIUM 3.8 3.8   CHLORIDE 105 106   NERIS 9.7 9.2   CO2 22 22   BUN 12.4 11.4   CR 0.71 0.76   GLC 68* 90       Liver Function Studies -   Recent Labs   Lab Test 05/21/24  1113 11/14/23  0600   PROTTOTAL 7.4 6.8   ALBUMIN 4.3 3.8   BILITOTAL 0.2 0.3   ALKPHOS 71 67   AST 21 22   ALT 16 16       TSH   Date Value Ref Range Status   05/21/2024 1.01 0.30 - 4.20 uIU/mL Final   11/14/2023 1.96 0.30 - 4.20 uIU/mL Final       No results found for: \"A1C\"    ASSESSMENT/PLAN:  (G30.0,  F02.A18) Mild early onset Alzheimer's " dementia with other behavioral disturbance (H)  (primary encounter diagnosis)  (F41.9) Anxiety disorder, unspecified type  Comment: hx of pacing, wandering, crying   Plan:   -celexa to 10 mg po daily  -aricept at 10 mg po daily  -atarax prn   -namenda 10 mg po BID    (Z87.440) Personal history of urinary tract infection  Comment: on prophylaxis   Plan:   -UA/UC for UTI      Electronically signed by:  REI Pino CNP               Sincerely,        REI Pino CNP

## 2024-08-14 PROCEDURE — 87086 URINE CULTURE/COLONY COUNT: CPT | Mod: ORL | Performed by: NURSE PRACTITIONER

## 2024-08-14 PROCEDURE — 81003 URINALYSIS AUTO W/O SCOPE: CPT | Mod: ORL | Performed by: NURSE PRACTITIONER

## 2024-08-15 ENCOUNTER — LAB REQUISITION (OUTPATIENT)
Dept: LAB | Facility: CLINIC | Age: 69
End: 2024-08-15
Payer: MEDICARE

## 2024-08-15 DIAGNOSIS — R41.82 ALTERED MENTAL STATUS, UNSPECIFIED: ICD-10-CM

## 2024-08-15 DIAGNOSIS — R53.1 WEAKNESS: ICD-10-CM

## 2024-08-15 LAB
ALBUMIN UR-MCNC: NEGATIVE MG/DL
APPEARANCE UR: CLEAR
BILIRUB UR QL STRIP: NEGATIVE
COLOR UR AUTO: ABNORMAL
GLUCOSE UR STRIP-MCNC: NEGATIVE MG/DL
HGB UR QL STRIP: NEGATIVE
KETONES UR STRIP-MCNC: NEGATIVE MG/DL
LEUKOCYTE ESTERASE UR QL STRIP: ABNORMAL
NITRATE UR QL: NEGATIVE
PH UR STRIP: 5.5 [PH] (ref 5–7)
SP GR UR STRIP: 1.02 (ref 1–1.03)
UROBILINOGEN UR STRIP-MCNC: NORMAL MG/DL

## 2024-08-16 LAB — BACTERIA UR CULT: NORMAL

## 2024-09-16 DIAGNOSIS — K59.01 SLOW TRANSIT CONSTIPATION: Primary | ICD-10-CM

## 2024-09-17 RX ORDER — DOCUSATE SODIUM 50MG AND SENNOSIDES 8.6MG 8.6; 5 MG/1; MG/1
1 TABLET, FILM COATED ORAL DAILY
Qty: 90 TABLET | Refills: 97 | Status: SHIPPED | OUTPATIENT
Start: 2024-09-17

## 2024-10-14 DIAGNOSIS — G30.9 ALZHEIMER'S DEMENTIA (H): ICD-10-CM

## 2024-10-14 DIAGNOSIS — F02.80 ALZHEIMER'S DEMENTIA (H): ICD-10-CM

## 2024-10-14 DIAGNOSIS — N39.0 RECURRENT URINARY TRACT INFECTION: ICD-10-CM

## 2024-10-14 DIAGNOSIS — Z78.9 TAKES DIETARY SUPPLEMENTS: ICD-10-CM

## 2024-10-14 NOTE — PROGRESS NOTES
[facility]  [place] [facility] Record Number:  [id number]  Place of Service where encounter took place:  [facility] [[id number]]  Chief Complaint   Patient presents with    RECHECK       HPI:    Kim Herrera  is a 69 year old (1955), who is being seen today for an episodic care visit.  HPI information obtained from: facility chart records, facility staff, patient report, and [facility] chart review. Today's concern is:    Seen today for follow up to Alzheimer's progression. Daughter is not taking her out on DEVYN as resident was having difficulty adjusting to facility. Does better with 1:1. Does not appear able to mount any resources for self soothing. Less anxious and agitated today. Minimal use of prn Atarax noted in chart review. Staff reporting still needs much encouragement to leave her apartment. She is agreeable today as the toddlers are visiting.Her lexapro was increased 7/24/24. She is sleeping overnight.     Past Medical and Surgical History reviewed in Epic today.    MEDICATIONS:    Current Outpatient Medications   Medication Sig Dispense Refill    Calcium Carb-Cholecalciferol (CALCIUM-VITAMIN D3) 600-12.5 MG-MCG CAPS Take 1 tablet by mouth 2 times daily 60 capsule 11    donepezil (ARICEPT) 10 MG tablet TAKE 1 TABLET BY MOUTH ONCE DAILY (TAKE WITH 5MG FOR A TOTAL OF 15MG) 90 tablet 97    escitalopram (LEXAPRO) 10 MG tablet Take 1 tablet (10 mg) by mouth daily 30 tablet 11    fish oil-omega-3 fatty acids 1000 MG capsule TAKE 1 CAPSULE BY MOUTH TWICE DAILY 180 capsule 97    hydrOXYzine HCl (ATARAX) 25 MG tablet TAKE ONE-HALF TABLET (12.5 MG) BY MOUTH EVERY 6 HOURS AS NEEDED FOR ANXIETY 15 tablet 11    memantine (NAMENDA) 10 MG tablet TAKE 1 TABLET BY MOUTH TWICE DAILY 180 tablet 97    nitroFURantoin macrocrystal (MACRODANTIN) 100 MG capsule TAKE 1 CAPSULE BY MOUTH ONCE DAILY 90 capsule 97    SENEXON-S 8.6-50 MG tablet TAKE 1 TABLET BY MOUTH ONCE DAILY 90  "tablet 97    VITAMIN D3 25 MCG (1000 UT) tablet TAKE 1 TABLET BY MOUTH ONCE DAILY 90 tablet 97       REVIEW OF SYSTEMS:  Limited secondary to cognitive impairment but today pt reports ok    Objective:  /65   Pulse 60   Resp 16   Ht 1.676 m (5' 6\")   Wt 82.8 kg (182 lb 9.6 oz)   SpO2 97%   BMI 29.47 kg/m    Exam:  GENERAL APPEARANCE:  Alert, anxious, less knee bouncing and long history of   ENT:  Mouth and posterior oropharynx normal, moist mucous membranes, normal hearing acuity  EYES:  EOM, conjunctivae, lids, pupils and irises normal but distant gaze with right eye-tracking ok  RESP:  lungs clear to auscultation   CV:  regular rate and rhythm, no murmur, rub, or gallop, no edema  ABDOMEN:  no guarding or rebound  M/S:   Gait and station normal-no assistive device needed   SKIN: intact to visualized areas but limited exam   NEURO:   Cranial nerves 2-12 are normal tested and grossly at patient's baseline  PSYCH:  insight and judgement impaired, memory    Labs:   CBC RESULTS:   Recent Labs   Lab Test 05/21/24  1113 11/14/23  0600   WBC 6.5 5.0   RBC 4.63 4.55   HGB 14.3 14.0   HCT 43.2 42.7   MCV 93 94   MCH 30.9 30.8   MCHC 33.1 32.8   RDW 13.2 13.1    250       Last Basic Metabolic Panel:  Recent Labs   Lab Test 05/21/24  1113 11/14/23  0600    142   POTASSIUM 3.8 3.8   CHLORIDE 105 106   NERIS 9.7 9.2   CO2 22 22   BUN 12.4 11.4   CR 0.71 0.76   GLC 68* 90       Liver Function Studies -   Recent Labs   Lab Test 05/21/24  1113 11/14/23  0600   PROTTOTAL 7.4 6.8   ALBUMIN 4.3 3.8   BILITOTAL 0.2 0.3   ALKPHOS 71 67   AST 21 22   ALT 16 16       TSH   Date Value Ref Range Status   05/21/2024 1.01 0.30 - 4.20 uIU/mL Final   11/14/2023 1.96 0.30 - 4.20 uIU/mL Final       No results found for: \"A1C\"    ASSESSMENT/PLAN:  (G30.0,  F02.A18) 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

## 2024-10-15 ENCOUNTER — ASSISTED LIVING VISIT (OUTPATIENT)
Dept: GERIATRICS | Facility: CLINIC | Age: 69
End: 2024-10-15
Payer: MEDICARE

## 2024-10-15 VITALS
DIASTOLIC BLOOD PRESSURE: 65 MMHG | RESPIRATION RATE: 16 BRPM | WEIGHT: 182.6 LBS | OXYGEN SATURATION: 97 % | HEART RATE: 60 BPM | SYSTOLIC BLOOD PRESSURE: 101 MMHG | BODY MASS INDEX: 29.35 KG/M2 | HEIGHT: 66 IN

## 2024-10-15 DIAGNOSIS — F41.9 ANXIETY DISORDER, UNSPECIFIED TYPE: ICD-10-CM

## 2024-10-15 DIAGNOSIS — G30.0 MILD EARLY ONSET ALZHEIMER'S DEMENTIA WITH OTHER BEHAVIORAL DISTURBANCE (H): Primary | ICD-10-CM

## 2024-10-15 DIAGNOSIS — F02.A18 MILD EARLY ONSET ALZHEIMER'S DEMENTIA WITH OTHER BEHAVIORAL DISTURBANCE (H): Primary | ICD-10-CM

## 2024-10-15 DIAGNOSIS — Z87.440 PERSONAL HISTORY OF URINARY TRACT INFECTION: ICD-10-CM

## 2024-10-15 PROCEDURE — 99349 HOME/RES VST EST MOD MDM 40: CPT | Performed by: NURSE PRACTITIONER

## 2024-10-15 RX ORDER — NITROFURANTOIN MACROCRYSTALS 100 MG/1
100 CAPSULE ORAL DAILY
Qty: 90 CAPSULE | Refills: 97 | Status: SHIPPED | OUTPATIENT
Start: 2024-10-15

## 2024-10-15 RX ORDER — CHOLECALCIFEROL (VITAMIN D3) 25 MCG
1 TABLET ORAL DAILY
Qty: 90 TABLET | Refills: 97 | Status: SHIPPED | OUTPATIENT
Start: 2024-10-15

## 2024-10-15 RX ORDER — DONEPEZIL HYDROCHLORIDE 10 MG/1
TABLET, FILM COATED ORAL
Qty: 90 TABLET | Refills: 97 | Status: SHIPPED | OUTPATIENT
Start: 2024-10-15

## 2024-10-15 RX ORDER — MEMANTINE HYDROCHLORIDE 10 MG/1
10 TABLET ORAL 2 TIMES DAILY
Qty: 180 TABLET | Refills: 97 | Status: SHIPPED | OUTPATIENT
Start: 2024-10-15

## 2024-10-15 NOTE — LETTER
10/15/2024      Kim Herrera  C/o Herbie Herrera  [address] [address]        [facility]  [place] [facility] Record Number:  [id number]  Place of Service where encounter took place:  [facility] [[id number]]  Chief Complaint   Patient presents with     RECHECK       HPI:    Kim Herrera  is a 69 year old (1955), who is being seen today for an episodic care visit.  HPI information obtained from: facility chart records, facility staff, patient report, and [facility] chart review. Today's concern is:    Seen today for follow up to Alzheimer's progression. Daughter is not taking her out on DEVYN as resident was having difficulty adjusting to facility. Does better with 1:1. Does not appear able to mount any resources for self soothing. Less anxious and agitated today. Minimal use of prn Atarax noted in chart review. Staff reporting still needs much encouragement to leave her apartment. She is agreeable today as the toddlers are visiting.Her lexapro was increased 7/24/24. She is sleeping overnight.     Past Medical and Surgical History reviewed in Epic today.    MEDICATIONS:    Current Outpatient Medications   Medication Sig Dispense Refill     Calcium Carb-Cholecalciferol (CALCIUM-VITAMIN D3) 600-12.5 MG-MCG CAPS Take 1 tablet by mouth 2 times daily 60 capsule 11     donepezil (ARICEPT) 10 MG tablet TAKE 1 TABLET BY MOUTH ONCE DAILY (TAKE WITH 5MG FOR A TOTAL OF 15MG) 90 tablet 97     escitalopram (LEXAPRO) 10 MG tablet Take 1 tablet (10 mg) by mouth daily 30 tablet 11     fish oil-omega-3 fatty acids 1000 MG capsule TAKE 1 CAPSULE BY MOUTH TWICE DAILY 180 capsule 97     hydrOXYzine HCl (ATARAX) 25 MG tablet TAKE ONE-HALF TABLET (12.5 MG) BY MOUTH EVERY 6 HOURS AS NEEDED FOR ANXIETY 15 tablet 11     memantine (NAMENDA) 10 MG tablet TAKE 1 TABLET BY MOUTH TWICE DAILY 180 tablet 97     nitroFURantoin macrocrystal (MACRODANTIN) 100 MG capsule TAKE 1  "CAPSULE BY MOUTH ONCE DAILY 90 capsule 97     SENEXON-S 8.6-50 MG tablet TAKE 1 TABLET BY MOUTH ONCE DAILY 90 tablet 97     VITAMIN D3 25 MCG (1000 UT) tablet TAKE 1 TABLET BY MOUTH ONCE DAILY 90 tablet 97       REVIEW OF SYSTEMS:  Limited secondary to cognitive impairment but today pt reports ok    Objective:  /65   Pulse 60   Resp 16   Ht 1.676 m (5' 6\")   Wt 82.8 kg (182 lb 9.6 oz)   SpO2 97%   BMI 29.47 kg/m    Exam:  GENERAL APPEARANCE:  Alert, anxious, less knee bouncing and long history of   ENT:  Mouth and posterior oropharynx normal, moist mucous membranes, normal hearing acuity  EYES:  EOM, conjunctivae, lids, pupils and irises normal but distant gaze with right eye-tracking ok  RESP:  lungs clear to auscultation   CV:  regular rate and rhythm, no murmur, rub, or gallop, no edema  ABDOMEN:  no guarding or rebound  M/S:   Gait and station normal-no assistive device needed   SKIN: intact to visualized areas but limited exam   NEURO:   Cranial nerves 2-12 are normal tested and grossly at patient's baseline  PSYCH:  insight and judgement impaired, memory    Labs:   CBC RESULTS:   Recent Labs   Lab Test 05/21/24  1113 11/14/23  0600   WBC 6.5 5.0   RBC 4.63 4.55   HGB 14.3 14.0   HCT 43.2 42.7   MCV 93 94   MCH 30.9 30.8   MCHC 33.1 32.8   RDW 13.2 13.1    250       Last Basic Metabolic Panel:  Recent Labs   Lab Test 05/21/24  1113 11/14/23  0600    142   POTASSIUM 3.8 3.8   CHLORIDE 105 106   NERIS 9.7 9.2   CO2 22 22   BUN 12.4 11.4   CR 0.71 0.76   GLC 68* 90       Liver Function Studies -   Recent Labs   Lab Test 05/21/24  1113 11/14/23  0600   PROTTOTAL 7.4 6.8   ALBUMIN 4.3 3.8   BILITOTAL 0.2 0.3   ALKPHOS 71 67   AST 21 22   ALT 16 16       TSH   Date Value Ref Range Status   05/21/2024 1.01 0.30 - 4.20 uIU/mL Final   11/14/2023 1.96 0.30 - 4.20 uIU/mL Final       No results found for: \"A1C\"    ASSESSMENT/PLAN:  (G30.0,  F02.A18) Mild early onset Alzheimer's dementia with other " behavioral disturbance (H)  (primary encounter diagnosis)  (F41.9) Anxiety disorder, unspecified type  Comment: hx of pacing, wandering, crying   Plan:   -celexa to 10 mg po daily (move to HS dosing)   -aricept at 10 mg po daily  -atarax prn   -namenda 10 mg po BID     (Z87.440) Personal history of urinary tract infection  Comment: on prophylaxis   Plan:   -UA/UC for UTI                  Electronically signed by:  REI Pino CNP             Sincerely,        REI Pino CNP

## 2024-10-15 NOTE — LETTER
Kim Herrera orders:    -move celexa to           Ifeanyi Chandler, APRN CNP on 10/15/2024 at 10:51 AM

## 2024-11-14 DIAGNOSIS — Z78.9 TAKES DIETARY SUPPLEMENTS: ICD-10-CM

## 2024-11-15 RX ORDER — CALCIUM CARBONATE/VITAMIN D3 600MG-12.5
1 CAPSULE ORAL 2 TIMES DAILY
Qty: 180 CAPSULE | Refills: 97 | Status: SHIPPED | OUTPATIENT
Start: 2024-11-15

## 2024-12-09 VITALS
RESPIRATION RATE: 18 BRPM | TEMPERATURE: 98.4 F | OXYGEN SATURATION: 92 % | HEART RATE: 88 BPM | BODY MASS INDEX: 27.77 KG/M2 | WEIGHT: 172.8 LBS | HEIGHT: 66 IN | DIASTOLIC BLOOD PRESSURE: 76 MMHG | SYSTOLIC BLOOD PRESSURE: 109 MMHG

## 2024-12-09 NOTE — PROGRESS NOTES
"[facility]  [place] [facility] Record Number:  [id number]  Place of Service where encounter took place:  [facility] [[id number]]  Chief Complaint   Patient presents with    RECHECK       HPI:    Kim Herrera  is a 69 year old (1955), who is being seen today for an episodic care visit.  HPI information obtained from: facility chart records, facility staff, patient report, and family/first contact  report. Today's concern is:    Seen today for follow up to skin lesion of right outer ear.  says she has had this for a while and it \"comes and goes\". Today is appears enlarging, red, swollen, slightly red and painful to touch. She does sleep on the side of her head and spends much time in bed although at 2 separate activities today and \"dancing\" so not concerned with additional reports of hip pain    Past Medical and Surgical History reviewed in Epic today.    MEDICATIONS:    Current Outpatient Medications   Medication Sig Dispense Refill    donepezil (ARICEPT) 10 MG tablet TAKE 1 TABLET BY MOUTH ONCE DAILY (TAKE WITH 5MG FOR A TOTAL OF 15MG) 90 tablet 97    escitalopram (LEXAPRO) 10 MG tablet Take 1 tablet (10 mg) by mouth daily 30 tablet 11    fish oil-omega-3 fatty acids 1000 MG capsule TAKE 1 CAPSULE BY MOUTH TWICE DAILY 180 capsule 97    hydrOXYzine HCl (ATARAX) 25 MG tablet TAKE ONE-HALF TABLET (12.5 MG) BY MOUTH EVERY 6 HOURS AS NEEDED FOR ANXIETY 15 tablet 11    LIQUID CALCIUM WITH D3 600-12.5 MG-MCG CAPS TAKE 1 CAPSULE BY MOUTH TWICE DAILY 180 capsule 97    memantine (NAMENDA) 10 MG tablet TAKE 1 TABLET BY MOUTH TWICE DAILY 180 tablet 97    nitroFURantoin macrocrystal (MACRODANTIN) 100 MG capsule TAKE 1 CAPSULE BY MOUTH ONCE DAILY 90 capsule 97    SENEXON-S 8.6-50 MG tablet TAKE 1 TABLET BY MOUTH ONCE DAILY 90 tablet 97    VITAMIN D3 25 MCG (1000 UT) tablet TAKE 1 TABLET BY MOUTH ONCE DAILY 90 tablet 97     REVIEW OF SYSTEMS:  Unobtainable secondary to " "cognitive impairment.     Objective:  /76   Pulse 88   Temp 98.4  F (36.9  C)   Resp 18   Ht 1.676 m (5' 6\")   Wt 78.4 kg (172 lb 12.8 oz)   SpO2 92%   BMI 27.89 kg/m    Exam:  GENERAL APPEARANCE:  Alert, anxious, less knee bouncing and long history of   ENT:  Mouth and posterior oropharynx normal, moist mucous membranes, normal hearing acuity  EYES:  EOM, conjunctivae, lids, pupils and irises normal but distant gaze with right eye-tracking ok  RESP:  lungs clear to auscultation   CV:  regular rate and rhythm, no murmur, rub, or gallop, no edema  ABDOMEN:  no guarding or rebound  M/S:   Gait and station normal-no assistive device needed   SKIN: see hpi for right ear lesion, hx of darker mole on back   NEURO:   Cranial nerves 2-12 are normal tested and grossly at patient's baseline  PSYCH:  insight and judgement impaired, memory       Labs:   CBC RESULTS:   Recent Labs   Lab Test 05/21/24  1113 11/14/23  0600   WBC 6.5 5.0   RBC 4.63 4.55   HGB 14.3 14.0   HCT 43.2 42.7   MCV 93 94   MCH 30.9 30.8   MCHC 33.1 32.8   RDW 13.2 13.1    250       Last Basic Metabolic Panel:  Recent Labs   Lab Test 05/21/24  1113 11/14/23  0600    142   POTASSIUM 3.8 3.8   CHLORIDE 105 106   NERIS 9.7 9.2   CO2 22 22   BUN 12.4 11.4   CR 0.71 0.76   GLC 68* 90       Liver Function Studies -   Recent Labs   Lab Test 05/21/24  1113 11/14/23  0600   PROTTOTAL 7.4 6.8   ALBUMIN 4.3 3.8   BILITOTAL 0.2 0.3   ALKPHOS 71 67   AST 21 22   ALT 16 16       TSH   Date Value Ref Range Status   05/21/2024 1.01 0.30 - 4.20 uIU/mL Final   11/14/2023 1.96 0.30 - 4.20 uIU/mL Final       No results found for: \"A1C\"    ASSESSMENT/PLAN:  (L98.9) Skin lesion  (primary encounter diagnosis)  Comment: reviewed with her  who lives up [place]. Her daughter is in town, unfortunately just injured her back but he will update her for clinic appointment    Plan:   -would like eval and treat at dermatology if able. Would also have mole on " back examined if not previously done.   -consider course of antibiotics if much delay with clinic appointment       Electronically signed by:  REI Pino CNP

## 2024-12-10 ENCOUNTER — ASSISTED LIVING VISIT (OUTPATIENT)
Dept: GERIATRICS | Facility: CLINIC | Age: 69
End: 2024-12-10
Payer: MEDICARE

## 2024-12-10 DIAGNOSIS — L98.9 SKIN LESION: Primary | ICD-10-CM

## 2024-12-10 PROCEDURE — 99349 HOME/RES VST EST MOD MDM 40: CPT | Performed by: NURSE PRACTITIONER

## 2024-12-10 NOTE — LETTER
" 12/10/2024      Kim Herrera  C/o Herbie Herrera  [address] [address]        [facility]  [place] [facility] Record Number:  [id number]  Place of Service where encounter took place:  [facility] [[id number]]  Chief Complaint   Patient presents with     RECHECK       HPI:    Kim Herrera  is a 69 year old (1955), who is being seen today for an episodic care visit.  HPI information obtained from: facility chart records, facility staff, patient report, and family/first contact  report. Today's concern is:    Seen today for follow up to skin lesion of right outer ear.  says she has had this for a while and it \"comes and goes\". Today is appears enlarging, red, swollen, slightly red and painful to touch. She does sleep on the side of her head and spends much time in bed although at 2 separate activities today and \"dancing\" so not concerned with additional reports of hip pain    Past Medical and Surgical History reviewed in Epic today.    MEDICATIONS:    Current Outpatient Medications   Medication Sig Dispense Refill     donepezil (ARICEPT) 10 MG tablet TAKE 1 TABLET BY MOUTH ONCE DAILY (TAKE WITH 5MG FOR A TOTAL OF 15MG) 90 tablet 97     escitalopram (LEXAPRO) 10 MG tablet Take 1 tablet (10 mg) by mouth daily 30 tablet 11     fish oil-omega-3 fatty acids 1000 MG capsule TAKE 1 CAPSULE BY MOUTH TWICE DAILY 180 capsule 97     hydrOXYzine HCl (ATARAX) 25 MG tablet TAKE ONE-HALF TABLET (12.5 MG) BY MOUTH EVERY 6 HOURS AS NEEDED FOR ANXIETY 15 tablet 11     LIQUID CALCIUM WITH D3 600-12.5 MG-MCG CAPS TAKE 1 CAPSULE BY MOUTH TWICE DAILY 180 capsule 97     memantine (NAMENDA) 10 MG tablet TAKE 1 TABLET BY MOUTH TWICE DAILY 180 tablet 97     nitroFURantoin macrocrystal (MACRODANTIN) 100 MG capsule TAKE 1 CAPSULE BY MOUTH ONCE DAILY 90 capsule 97     SENEXON-S 8.6-50 MG tablet TAKE 1 TABLET BY MOUTH ONCE DAILY 90 tablet 97     VITAMIN D3 25 MCG " "(1000 UT) tablet TAKE 1 TABLET BY MOUTH ONCE DAILY 90 tablet 97     REVIEW OF SYSTEMS:  Unobtainable secondary to cognitive impairment.     Objective:  /76   Pulse 88   Temp 98.4  F (36.9  C)   Resp 18   Ht 1.676 m (5' 6\")   Wt 78.4 kg (172 lb 12.8 oz)   SpO2 92%   BMI 27.89 kg/m    Exam:  GENERAL APPEARANCE:  Alert, anxious, less knee bouncing and long history of   ENT:  Mouth and posterior oropharynx normal, moist mucous membranes, normal hearing acuity  EYES:  EOM, conjunctivae, lids, pupils and irises normal but distant gaze with right eye-tracking ok  RESP:  lungs clear to auscultation   CV:  regular rate and rhythm, no murmur, rub, or gallop, no edema  ABDOMEN:  no guarding or rebound  M/S:   Gait and station normal-no assistive device needed   SKIN: see hpi for right ear lesion, hx of darker mole on back   NEURO:   Cranial nerves 2-12 are normal tested and grossly at patient's baseline  PSYCH:  insight and judgement impaired, memory       Labs:   CBC RESULTS:   Recent Labs   Lab Test 05/21/24  1113 11/14/23  0600   WBC 6.5 5.0   RBC 4.63 4.55   HGB 14.3 14.0   HCT 43.2 42.7   MCV 93 94   MCH 30.9 30.8   MCHC 33.1 32.8   RDW 13.2 13.1    250       Last Basic Metabolic Panel:  Recent Labs   Lab Test 05/21/24  1113 11/14/23  0600    142   POTASSIUM 3.8 3.8   CHLORIDE 105 106   NERIS 9.7 9.2   CO2 22 22   BUN 12.4 11.4   CR 0.71 0.76   GLC 68* 90       Liver Function Studies -   Recent Labs   Lab Test 05/21/24  1113 11/14/23  0600   PROTTOTAL 7.4 6.8   ALBUMIN 4.3 3.8   BILITOTAL 0.2 0.3   ALKPHOS 71 67   AST 21 22   ALT 16 16       TSH   Date Value Ref Range Status   05/21/2024 1.01 0.30 - 4.20 uIU/mL Final   11/14/2023 1.96 0.30 - 4.20 uIU/mL Final       No results found for: \"A1C\"    ASSESSMENT/PLAN:  (L98.9) Skin lesion  (primary encounter diagnosis)  Comment: reviewed with her  who lives up [place]. Her daughter is in town, unfortunately just injured her back but he will update " her for clinic appointment    Plan:   -would like eval and treat at dermatology if able. Would also have mole on back examined if not previously done.   -consider course of antibiotics if much delay with clinic appointment       Electronically signed by:  REI Pino CNP           Sincerely,        REI Pino CNP

## 2024-12-17 DIAGNOSIS — R52 PAIN: Primary | ICD-10-CM

## 2024-12-17 RX ORDER — ACETAMINOPHEN 325 MG/1
650 TABLET ORAL 2 TIMES DAILY
Qty: 30 TABLET | Refills: 4 | Status: SHIPPED | OUTPATIENT
Start: 2024-12-17

## 2024-12-17 NOTE — PROGRESS NOTES
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    Plan:   -change prn tylenol to     acetaminophen (TYLENOL) 325 MG tablet Take 2 tablets (650 mg) by mouth 2 times daily. And BID prn

## 2024-12-29 DIAGNOSIS — R52 PAIN: ICD-10-CM

## 2024-12-30 RX ORDER — ACETAMINOPHEN 325 MG/1
TABLET ORAL
Qty: 124 TABLET | Refills: 97 | Status: SHIPPED | OUTPATIENT
Start: 2024-12-30

## 2025-03-03 VITALS
SYSTOLIC BLOOD PRESSURE: 150 MMHG | TEMPERATURE: 97.6 F | DIASTOLIC BLOOD PRESSURE: 76 MMHG | OXYGEN SATURATION: 93 % | WEIGHT: 174.8 LBS | HEIGHT: 66 IN | BODY MASS INDEX: 28.09 KG/M2 | RESPIRATION RATE: 14 BRPM | HEART RATE: 65 BPM

## 2025-03-03 NOTE — PROGRESS NOTES
"[facility]  [place] [facility] Record Number:  [id number]  Place of Service where encounter took place:  [facility] [[id number]]  Chief Complaint   Patient presents with    RECHECK     dysphagia       HPI:    Kim Herrera  is a 69 year old (1955), who is being seen today for an episodic care visit.  HPI information obtained from: facility chart records, facility staff, and [facility] chart review. Today's concern is:    Follow up to dysphagia, refusing pills. Staff are finding pills in her bed. She is pocketing the pills and visibly having difficultly swallowing the larger ones. No concerns about eating or drinking. Weight lower since November but stable. On standard diet. BP higher with last check but mostly lower.     Past Medical and Surgical History reviewed in Epic today.    MEDICATIONS:    Current Outpatient Medications   Medication Sig Dispense Refill    acetaminophen (TYLENOL) 325 MG tablet Take 2 tablets (650 mg) by mouth 2 times daily as needed for mild pain. 124 tablet 97    donepezil (ARICEPT) 10 MG tablet TAKE 1 TABLET BY MOUTH ONCE DAILY (TAKE WITH 5MG FOR A TOTAL OF 15MG) 90 tablet 97    escitalopram (LEXAPRO) 10 MG tablet Take 1 tablet (10 mg) by mouth daily 30 tablet 11    hydrOXYzine HCl (ATARAX) 25 MG tablet TAKE ONE-HALF TABLET (12.5 MG) BY MOUTH EVERY 6 HOURS AS NEEDED FOR ANXIETY 15 tablet 11    memantine (NAMENDA) 10 MG tablet TAKE 1 TABLET BY MOUTH TWICE DAILY 180 tablet 97    nitroFURantoin macrocrystal (MACRODANTIN) 100 MG capsule TAKE 1 CAPSULE BY MOUTH ONCE DAILY 90 capsule 97    SENEXON-S 8.6-50 MG tablet TAKE 1 TABLET BY MOUTH ONCE DAILY 90 tablet 97    VITAMIN D3 25 MCG (1000 UT) tablet TAKE 1 TABLET BY MOUTH ONCE DAILY 90 tablet 97     REVIEW OF SYSTEMS:  Unobtainable secondary to cognitive impairment.     Objective:  BP (!) 150/76   Pulse 65   Temp 97.6  F (36.4  C)   Resp 14   Ht 1.676 m (5' 6\")   Wt 79.3 kg (174 lb 12.8 oz)   " "SpO2 93%   BMI 28.21 kg/m    Exam:  GENERAL APPEARANCE:  Alert, anxious, less knee bouncing and long history of anxiety  ENT:  Mouth and posterior oropharynx normal, moist mucous membranes, normal hearing acuity  EYES:  EOM, conjunctivae, lids, pupils and irises normal but distant gaze with right eye-tracking ok  RESP:  lungs clear to auscultation   CV:  regular rate and rhythm, no murmur, rub, or gallop, no edema  ABDOMEN:  no guarding or rebound  M/S:   Gait and station normal-no assistive device needed   SKIN:   NEURO:   Cranial nerves 2-12 are normal tested and grossly at patient's baseline  PSYCH:  insight and judgement impaired, memory    Labs:   CBC RESULTS:   Recent Labs   Lab Test 05/21/24  1113 11/14/23  0600   WBC 6.5 5.0   RBC 4.63 4.55   HGB 14.3 14.0   HCT 43.2 42.7   MCV 93 94   MCH 30.9 30.8   MCHC 33.1 32.8   RDW 13.2 13.1    250       Last Basic Metabolic Panel:  Recent Labs   Lab Test 05/21/24  1113 11/14/23  0600    142   POTASSIUM 3.8 3.8   CHLORIDE 105 106   NERIS 9.7 9.2   CO2 22 22   BUN 12.4 11.4   CR 0.71 0.76   GLC 68* 90       Liver Function Studies -   Recent Labs   Lab Test 05/21/24  1113 11/14/23  0600   PROTTOTAL 7.4 6.8   ALBUMIN 4.3 3.8   BILITOTAL 0.2 0.3   ALKPHOS 71 67   AST 21 22   ALT 16 16       TSH   Date Value Ref Range Status   05/21/2024 1.01 0.30 - 4.20 uIU/mL Final   11/14/2023 1.96 0.30 - 4.20 uIU/mL Final       No results found for: \"A1C\"    Recent Labs   Lab Test 11/14/23  0600   CHOL 228*   HDL 56   *   TRIG 181*       ASSESSMENT/PLAN:  (R13.10) Dysphagia, unspecified type  (primary encounter diagnosis)  (G30.0,  F02.B4) Moderate early onset Alzheimer's dementia with anxiety (H)  (F41.9) Anxiety disorder, unspecified type  (R52) Pain  (E78.5) Hyperlipidemia, unspecified hyperlipidemia     -reducing pill burden with difficulty taking pills secondary to advancing dementia  -seen in conjunction with NP student   -may need to restart statin in future. " Repeat Lipid panel with next set of labs (5/2025) would be annually but will order now. Prior use of simvastatin 20 mg po daily and discontinue for unclear reasons-was not taking when admitted to this care facility       Electronically signed by:  REI Pino CNP

## 2025-03-04 ENCOUNTER — ASSISTED LIVING VISIT (OUTPATIENT)
Dept: GERIATRICS | Facility: CLINIC | Age: 70
End: 2025-03-04
Payer: MEDICARE

## 2025-03-04 DIAGNOSIS — E78.5 HYPERLIPIDEMIA, UNSPECIFIED HYPERLIPIDEMIA TYPE: ICD-10-CM

## 2025-03-04 DIAGNOSIS — G30.0 MODERATE EARLY ONSET ALZHEIMER'S DEMENTIA WITH ANXIETY (H): ICD-10-CM

## 2025-03-04 DIAGNOSIS — F02.B4 MODERATE EARLY ONSET ALZHEIMER'S DEMENTIA WITH ANXIETY (H): ICD-10-CM

## 2025-03-04 DIAGNOSIS — F41.9 ANXIETY DISORDER, UNSPECIFIED TYPE: ICD-10-CM

## 2025-03-04 DIAGNOSIS — R52 PAIN: ICD-10-CM

## 2025-03-04 DIAGNOSIS — R13.10 DYSPHAGIA, UNSPECIFIED TYPE: Primary | ICD-10-CM

## 2025-03-04 PROCEDURE — 99349 HOME/RES VST EST MOD MDM 40: CPT | Performed by: NURSE PRACTITIONER

## 2025-03-04 RX ORDER — ACETAMINOPHEN 325 MG/1
650 TABLET ORAL 2 TIMES DAILY PRN
Qty: 124 TABLET | Refills: 97 | Status: SHIPPED | OUTPATIENT
Start: 2025-03-04

## 2025-03-04 NOTE — LETTER
3/4/2025      Kim Herrera  C/o Herbie Herrera  [address] [facility] [address]        [place] [facility]  [place] Medical Record Number:  [id number]  Place of Service where encounter took place:  [facility] [[id number]]  Chief Complaint   Patient presents with     RECHECK     dysphagia       HPI:    Kim Herrera  is a 69 year old (1955), who is being seen today for an episodic care visit.  HPI information obtained from: facility chart records, facility staff, and [facility] chart review. Today's concern is:    Follow up to dysphagia, refusing pills. Staff are finding pills in her bed. She is pocketing the pills and visibly having difficultly swallowing the larger ones. No concerns about eating or drinking. Weight lower since November but stable. On standard diet. BP higher with last check but mostly lower.     Past Medical and Surgical History reviewed in Epic today.    MEDICATIONS:    Current Outpatient Medications   Medication Sig Dispense Refill     acetaminophen (TYLENOL) 325 MG tablet Take 2 tablets (650 mg) by mouth 2 times daily as needed for mild pain. 124 tablet 97     donepezil (ARICEPT) 10 MG tablet TAKE 1 TABLET BY MOUTH ONCE DAILY (TAKE WITH 5MG FOR A TOTAL OF 15MG) 90 tablet 97     escitalopram (LEXAPRO) 10 MG tablet Take 1 tablet (10 mg) by mouth daily 30 tablet 11     hydrOXYzine HCl (ATARAX) 25 MG tablet TAKE ONE-HALF TABLET (12.5 MG) BY MOUTH EVERY 6 HOURS AS NEEDED FOR ANXIETY 15 tablet 11     memantine (NAMENDA) 10 MG tablet TAKE 1 TABLET BY MOUTH TWICE DAILY 180 tablet 97     nitroFURantoin macrocrystal (MACRODANTIN) 100 MG capsule TAKE 1 CAPSULE BY MOUTH ONCE DAILY 90 capsule 97     SENEXON-S 8.6-50 MG tablet TAKE 1 TABLET BY MOUTH ONCE DAILY 90 tablet 97     VITAMIN D3 25 MCG (1000 UT) tablet TAKE 1 TABLET BY MOUTH ONCE DAILY 90 tablet 97     REVIEW OF SYSTEMS:  Unobtainable secondary to cognitive impairment.     Objective:  BP (!)  "150/76   Pulse 65   Temp 97.6  F (36.4  C)   Resp 14   Ht 1.676 m (5' 6\")   Wt 79.3 kg (174 lb 12.8 oz)   SpO2 93%   BMI 28.21 kg/m    Exam:  GENERAL APPEARANCE:  Alert, anxious, less knee bouncing and long history of anxiety  ENT:  Mouth and posterior oropharynx normal, moist mucous membranes, normal hearing acuity  EYES:  EOM, conjunctivae, lids, pupils and irises normal but distant gaze with right eye-tracking ok  RESP:  lungs clear to auscultation   CV:  regular rate and rhythm, no murmur, rub, or gallop, no edema  ABDOMEN:  no guarding or rebound  M/S:   Gait and station normal-no assistive device needed   SKIN:   NEURO:   Cranial nerves 2-12 are normal tested and grossly at patient's baseline  PSYCH:  insight and judgement impaired, memory    Labs:   CBC RESULTS:   Recent Labs   Lab Test 05/21/24  1113 11/14/23  0600   WBC 6.5 5.0   RBC 4.63 4.55   HGB 14.3 14.0   HCT 43.2 42.7   MCV 93 94   MCH 30.9 30.8   MCHC 33.1 32.8   RDW 13.2 13.1    250       Last Basic Metabolic Panel:  Recent Labs   Lab Test 05/21/24  1113 11/14/23  0600    142   POTASSIUM 3.8 3.8   CHLORIDE 105 106   NERIS 9.7 9.2   CO2 22 22   BUN 12.4 11.4   CR 0.71 0.76   GLC 68* 90       Liver Function Studies -   Recent Labs   Lab Test 05/21/24  1113 11/14/23  0600   PROTTOTAL 7.4 6.8   ALBUMIN 4.3 3.8   BILITOTAL 0.2 0.3   ALKPHOS 71 67   AST 21 22   ALT 16 16       TSH   Date Value Ref Range Status   05/21/2024 1.01 0.30 - 4.20 uIU/mL Final   11/14/2023 1.96 0.30 - 4.20 uIU/mL Final       No results found for: \"A1C\"    Recent Labs   Lab Test 11/14/23  0600   CHOL 228*   HDL 56   *   TRIG 181*       ASSESSMENT/PLAN:  (R13.10) Dysphagia, unspecified type  (primary encounter diagnosis)  (G30.0,  F02.B4) 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 Repeat Lipid panel with next set of labs (5/2025) would be annually but will order now. Prior use of simvastatin 20 mg po daily and discontinue for unclear reasons-was not taking when admitted to this care facility       Electronically signed by:  REI Pino CNP             Sincerely,        REI Pino CNP    Electronically signed

## 2025-03-04 NOTE — LETTER
Kim Herrera orders:     -discontinue scheduled tylenol and begin    acetaminophen (TYLENOL) 325 MG tablet Take 2 tablets (650 mg) by mouth 2 times daily as needed for mild pain.     -discontinue fish oil    -discontinue calcium/vitamin D    -BMP, CBC, LFTs for HTN    -lipid panel for hyperlipidemia           Electronically signed by:  REI Pino CNP

## 2025-03-05 ENCOUNTER — LAB REQUISITION (OUTPATIENT)
Dept: LAB | Facility: CLINIC | Age: 70
End: 2025-03-05
Payer: MEDICARE

## 2025-03-05 DIAGNOSIS — E78.5 HYPERLIPIDEMIA, UNSPECIFIED: ICD-10-CM

## 2025-03-05 DIAGNOSIS — I10 ESSENTIAL (PRIMARY) HYPERTENSION: ICD-10-CM

## 2025-03-11 LAB
ALBUMIN SERPL BCG-MCNC: 3.6 G/DL (ref 3.5–5.2)
ALP SERPL-CCNC: 61 U/L (ref 40–150)
ALT SERPL W P-5'-P-CCNC: 9 U/L (ref 0–50)
ANION GAP SERPL CALCULATED.3IONS-SCNC: 11 MMOL/L (ref 7–15)
AST SERPL W P-5'-P-CCNC: 12 U/L (ref 0–45)
BILIRUB DIRECT SERPL-MCNC: 0.11 MG/DL (ref 0–0.3)
BILIRUB SERPL-MCNC: 0.4 MG/DL
BUN SERPL-MCNC: 12.6 MG/DL (ref 8–23)
CALCIUM SERPL-MCNC: 9.3 MG/DL (ref 8.8–10.4)
CHLORIDE SERPL-SCNC: 107 MMOL/L (ref 98–107)
CHOLEST SERPL-MCNC: 230 MG/DL
CREAT SERPL-MCNC: 0.67 MG/DL (ref 0.51–0.95)
EGFRCR SERPLBLD CKD-EPI 2021: >90 ML/MIN/1.73M2
ERYTHROCYTE [DISTWIDTH] IN BLOOD BY AUTOMATED COUNT: 13.5 % (ref 10–15)
FASTING STATUS PATIENT QL REPORTED: ABNORMAL
GLUCOSE SERPL-MCNC: 86 MG/DL (ref 70–99)
HCO3 SERPL-SCNC: 24 MMOL/L (ref 22–29)
HCT VFR BLD AUTO: 39 % (ref 35–47)
HDLC SERPL-MCNC: 42 MG/DL
HGB BLD-MCNC: 12.7 G/DL (ref 11.7–15.7)
LDLC SERPL CALC-MCNC: 151 MG/DL
MCH RBC QN AUTO: 30.2 PG (ref 26.5–33)
MCHC RBC AUTO-ENTMCNC: 32.6 G/DL (ref 31.5–36.5)
MCV RBC AUTO: 93 FL (ref 78–100)
NONHDLC SERPL-MCNC: 188 MG/DL
PLATELET # BLD AUTO: 223 10E3/UL (ref 150–450)
POTASSIUM SERPL-SCNC: 3.5 MMOL/L (ref 3.4–5.3)
PROT SERPL-MCNC: 6.6 G/DL (ref 6.4–8.3)
RBC # BLD AUTO: 4.21 10E6/UL (ref 3.8–5.2)
SODIUM SERPL-SCNC: 142 MMOL/L (ref 135–145)
TRIGL SERPL-MCNC: 186 MG/DL
WBC # BLD AUTO: 5.7 10E3/UL (ref 4–11)

## 2025-03-11 PROCEDURE — 85027 COMPLETE CBC AUTOMATED: CPT | Mod: ORL | Performed by: NURSE PRACTITIONER

## 2025-03-11 PROCEDURE — P9604 ONE-WAY ALLOW PRORATED TRIP: HCPCS | Mod: ORL | Performed by: NURSE PRACTITIONER

## 2025-03-11 PROCEDURE — 80061 LIPID PANEL: CPT | Mod: ORL | Performed by: NURSE PRACTITIONER

## 2025-03-11 PROCEDURE — 80053 COMPREHEN METABOLIC PANEL: CPT | Mod: ORL | Performed by: NURSE PRACTITIONER

## 2025-03-11 PROCEDURE — 36415 COLL VENOUS BLD VENIPUNCTURE: CPT | Mod: ORL | Performed by: NURSE PRACTITIONER

## 2025-03-11 PROCEDURE — 82248 BILIRUBIN DIRECT: CPT | Mod: ORL | Performed by: NURSE PRACTITIONER

## 2025-03-12 DIAGNOSIS — E78.5 HYPERLIPIDEMIA LDL GOAL <100: Primary | ICD-10-CM

## 2025-03-12 RX ORDER — SIMVASTATIN 20 MG
20 TABLET ORAL AT BEDTIME
Qty: 30 TABLET | Refills: 11 | Status: SHIPPED | OUTPATIENT
Start: 2025-03-12

## 2025-03-12 NOTE — PROGRESS NOTES
Restarting statin with elevated lipid panel. Was stopped PTA for unclear reaasons    Plan:     Restart              simvastatin (ZOCOR) 20 MG tablet Take 1 tablet (20 mg) by mouth at bedtime.         Electronically signed by:  REI Pino CNP

## 2025-03-20 DIAGNOSIS — L08.9 LOCAL INFECTION OF SKIN AND SUBCUTANEOUS TISSUE: Primary | ICD-10-CM

## 2025-03-20 RX ORDER — DOXYCYCLINE 100 MG/1
100 CAPSULE ORAL 2 TIMES DAILY
Qty: 10 CAPSULE | Refills: 0 | Status: SHIPPED | OUTPATIENT
Start: 2025-03-20 | End: 2025-03-25

## 2025-03-20 NOTE — PROGRESS NOTES
Resident with ongoing wound/lesion to right earlobe noticed by her daughter during a visit with increased redness, swelling, no warmth, possible drainage. History of non to slow healing wound on right pinna, the antithetical fold, superior around 0.5cm area but now possible enlargement according to her daughter. Also, intermittent pain to the area when daughter attempts exam. No history of SCC or BCC per . No known prior piercing to this area. She does sleep throughout the night and increased sleeping on her right side so could be irritated. She has seen dermatology for this and a special pillow was recommended but daughter does not believe it is begin used. Resident does have advanced dementia and resides on  unit.      Plan:        doxycycline hyclate (VIBRAMYCIN) 100 MG capsule Take 1 capsule (100 mg) by mouth 2 times daily for 5 days.     -family will schedule additional visit with dermatology for evaluation     -nursing please document in [facility] if presentation is improved post antibiotic course         Electronically signed by:  REI Pino CNP

## 2025-06-05 ENCOUNTER — ASSISTED LIVING VISIT (OUTPATIENT)
Dept: GERIATRICS | Facility: CLINIC | Age: 70
End: 2025-06-05
Payer: MEDICARE

## 2025-06-05 VITALS
HEART RATE: 68 BPM | SYSTOLIC BLOOD PRESSURE: 121 MMHG | BODY MASS INDEX: 27.8 KG/M2 | OXYGEN SATURATION: 94 % | HEIGHT: 66 IN | DIASTOLIC BLOOD PRESSURE: 80 MMHG | WEIGHT: 173 LBS | RESPIRATION RATE: 18 BRPM

## 2025-06-05 DIAGNOSIS — F32.A DEPRESSION, UNSPECIFIED DEPRESSION TYPE: ICD-10-CM
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
## 2025-06-05 DIAGNOSIS — F41.9 ANXIETY DISORDER, UNSPECIFIED TYPE: ICD-10-CM

## 2025-06-05 NOTE — PROGRESS NOTES
[place] [facility]  [place] [facility] Record Number:  [id number]  Place of Service where encounter took place:  [facility] [[id number]]  Chief Complaint   Patient presents with    Fall       HPI:    Kim Herrera  is a 69 year old (1955), who is being seen today for an episodic care visit.  HPI information obtained from: facility chart records, facility staff, patient report, and [facility] chart review. Today's concern is: fall, anxiety, depression. Has alzheimer's. Increased anxiety at times. Cont. On atarax, lexapro. Had fall 6/3/25. Fell to floor, hit face. Has small scab L nose. No other apparent inj. Neuros stable. Some restlessness of Les-baseline. No reports of increased anxiety from baseline. No reports of increased depression, has been participating in activities. No increase in isolative behaviors. Po intake gen. Stable. Requires more finger foods due to dysphagia. Has meds crushed.        Past Medical and Surgical History reviewed in Epic today.    MEDICATIONS:    Current Outpatient Medications   Medication Sig Dispense Refill    acetaminophen (TYLENOL) 325 MG tablet Take 2 tablets (650 mg) by mouth 2 times daily as needed for mild pain. 124 tablet 97    donepezil (ARICEPT) 10 MG tablet TAKE 1 TABLET BY MOUTH ONCE DAILY (TAKE WITH 5MG FOR A TOTAL OF 15MG) 90 tablet 97    escitalopram (LEXAPRO) 10 MG tablet Take 1 tablet (10 mg) by mouth daily 30 tablet 11    hydrOXYzine HCl (ATARAX) 25 MG tablet TAKE ONE-HALF TABLET (12.5 MG) BY MOUTH EVERY 6 HOURS AS NEEDED FOR ANXIETY 15 tablet 11    memantine (NAMENDA) 10 MG tablet TAKE 1 TABLET BY MOUTH TWICE DAILY 180 tablet 97    nitroFURantoin macrocrystal (MACRODANTIN) 100 MG capsule TAKE 1 CAPSULE BY MOUTH ONCE DAILY 90 capsule 97    SENEXON-S 8.6-50 MG tablet TAKE 1 TABLET BY MOUTH ONCE DAILY 90 tablet 97    simvastatin (ZOCOR) 20 MG tablet Take 1 tablet (20 mg) by mouth at bedtime. 30 tablet 11    VITAMIN D3 25 MCG  "(1000 UT) tablet TAKE 1 TABLET BY MOUTH ONCE DAILY 90 tablet 97         REVIEW OF SYSTEMS:  Limited secondary to cognitive impairment but today pt reports no current pain. No resp. Or GI distress    Objective:  /80   Pulse 68   Resp 18   Ht 1.676 m (5' 6\")   Wt 78.5 kg (173 lb)   SpO2 94%   BMI 27.92 kg/m    Exam:  GENERAL APPEARANCE:  Alert, in no distress, cooperative  ENT:  Mouth and posterior oropharynx normal, moist mucous membranes, normal hearing acuity  EYES:  EOM, conjunctivae, lids, pupils and irises normal, PERRL  NECK:  FROM  RESP:  respiratory effort and palpation of chest normal, lungs clear to auscultation , no respiratory distress  CV:  Palpation and auscultation of heart done , regular rate and rhythm, no murmur, rub, or gallop, no edema  ABDOMEN:  normal bowel sounds, soft, nontender, no hepatosplenomegaly or other masses, no guarding or rebound  M/S:   muscle strength 5/5 all 4 ext. Gait steady. Does not use assistive device.   NEURO:   Cranial nerves 2-12 are normal tested and grossly at patient's baseline, speech fluid  PSYCH:  memory impaired , affect and mood normal, confusion present. Requires freq. Cues.    Labs:   Most Recent 3 CBC's:  Recent Labs   Lab Test 03/11/25  0600 05/21/24  1113 11/14/23  0600   WBC 5.7 6.5 5.0   HGB 12.7 14.3 14.0   MCV 93 93 94    232 250     Most Recent 3 BMP's:  Recent Labs   Lab Test 03/11/25  0600 05/21/24  1113 11/14/23  0600    141 142   POTASSIUM 3.5 3.8 3.8   CHLORIDE 107 105 106   CO2 24 22 22   BUN 12.6 12.4 11.4   CR 0.67 0.71 0.76   ANIONGAP 11 14 14   NERIS 9.3 9.7 9.2   GLC 86 68* 90       ASSESSMENT/PLAN:  (W19.XXXA) Fall, initial encounter  (primary encounter diagnosis)  Comment: no apparent inj. Scab L nose. No reports of pain  Plan: 1. Cont. Tylenol  2. Monitor for changes in gait, LE weakness  3. Cont. Macrobid for UTI prophylaxis, monitor for dysuria, increased confusion    (F41.9) Anxiety disorder, unspecified " type  Comment: occ increase in s/s. Mood gen. Stable.  Plan: 1. Cont. Atarax  2. Cont. Aricept, namenda  3. Monitor for changes in behaviors, decreased responsiveness to staff redirection    (F32.A) Depression, unspecified depression type  Comment: mood gen. Stable.  Plan: 1. Cont. Lexapro  2. Monitor for insomnia, isolative behaviors  3. Follow po intake, wt.s, for further wt. Loss, consider decrease in aricept dose              Electronically signed by:  REI Reagan CNP

## 2025-06-05 NOTE — LETTER
6/5/2025      Kim Herrera  C/o Herbie Herrera  [address] [facility] [address]        [place] [facility]  [place] [facility] Record Number:  [id number]  Place of Service where encounter took place:  [facility] [[id number]]  Chief Complaint   Patient presents with     Fall       HPI:    Kim Herrera  is a 69 year old (1955), who is being seen today for an episodic care visit.  HPI information obtained from: facility chart records, facility staff, patient report, and [facility] chart review. Today's concern is: fall, anxiety, depression. Has alzheimer's. Increased anxiety at times. Cont. On atarax, lexapro. Had fall 6/3/25. Fell to floor, hit face. Has small scab L nose. No other apparent inj. Neuros stable. Some restlessness of Les-baseline. No reports of increased anxiety from baseline. No reports of increased depression, has been participating in activities. No increase in isolative behaviors. Po intake gen. Stable. Requires more finger foods due to dysphagia. Has meds crushed.        Past Medical and Surgical History reviewed in Epic today.    MEDICATIONS:    Current Outpatient Medications   Medication Sig Dispense Refill     acetaminophen (TYLENOL) 325 MG tablet Take 2 tablets (650 mg) by mouth 2 times daily as needed for mild pain. 124 tablet 97     donepezil (ARICEPT) 10 MG tablet TAKE 1 TABLET BY MOUTH ONCE DAILY (TAKE WITH 5MG FOR A TOTAL OF 15MG) 90 tablet 97     escitalopram (LEXAPRO) 10 MG tablet Take 1 tablet (10 mg) by mouth daily 30 tablet 11     hydrOXYzine HCl (ATARAX) 25 MG tablet TAKE ONE-HALF TABLET (12.5 MG) BY MOUTH EVERY 6 HOURS AS NEEDED FOR ANXIETY 15 tablet 11     memantine (NAMENDA) 10 MG tablet TAKE 1 TABLET BY MOUTH TWICE DAILY 180 tablet 97     nitroFURantoin macrocrystal (MACRODANTIN) 100 MG capsule TAKE 1 CAPSULE BY MOUTH ONCE DAILY 90 capsule 97     SENEXON-S 8.6-50 MG tablet TAKE 1 TABLET BY MOUTH ONCE DAILY 90 tablet 97      "simvastatin (ZOCOR) 20 MG tablet Take 1 tablet (20 mg) by mouth at bedtime. 30 tablet 11     VITAMIN D3 25 MCG (1000 UT) tablet TAKE 1 TABLET BY MOUTH ONCE DAILY 90 tablet 97         REVIEW OF SYSTEMS:  Limited secondary to cognitive impairment but today pt reports no current pain. No resp. Or GI distress    Objective:  /80   Pulse 68   Resp 18   Ht 1.676 m (5' 6\")   Wt 78.5 kg (173 lb)   SpO2 94%   BMI 27.92 kg/m    Exam:  GENERAL APPEARANCE:  Alert, in no distress, cooperative  ENT:  Mouth and posterior oropharynx normal, moist mucous membranes, normal hearing acuity  EYES:  EOM, conjunctivae, lids, pupils and irises normal, PERRL  NECK:  FROM  RESP:  respiratory effort and palpation of chest normal, lungs clear to auscultation , no respiratory distress  CV:  Palpation and auscultation of heart done , regular rate and rhythm, no murmur, rub, or gallop, no edema  ABDOMEN:  normal bowel sounds, soft, nontender, no hepatosplenomegaly or other masses, no guarding or rebound  M/S:   muscle strength 5/5 all 4 ext. Gait steady. Does not use assistive device.   NEURO:   Cranial nerves 2-12 are normal tested and grossly at patient's baseline, speech fluid  PSYCH:  memory impaired , affect and mood normal, confusion present. Requires freq. Cues.    Labs:   Most Recent 3 CBC's:  Recent Labs   Lab Test 03/11/25  0600 05/21/24  1113 11/14/23  0600   WBC 5.7 6.5 5.0   HGB 12.7 14.3 14.0   MCV 93 93 94    232 250     Most Recent 3 BMP's:  Recent Labs   Lab Test 03/11/25  0600 05/21/24  1113 11/14/23  0600    141 142   POTASSIUM 3.5 3.8 3.8   CHLORIDE 107 105 106   CO2 24 22 22   BUN 12.6 12.4 11.4   CR 0.67 0.71 0.76   ANIONGAP 11 14 14   NERIS 9.3 9.7 9.2   GLC 86 68* 90       ASSESSMENT/PLAN:  (W19.XXXA) Fall, initial encounter  (primary encounter diagnosis)  Comment: no apparent inj. Scab L nose. No reports of pain  Plan: 1. Cont. Tylenol  2. Monitor for changes in gait, LE weakness  3. Cont. Macrobid " for UTI prophylaxis, monitor for dysuria, increased confusion    (F41.9) Anxiety disorder, unspecified type  Comment: occ increase in s/s. Mood gen. Stable.  Plan: 1. Cont. Atarax  2. Cont. Aricept, namenda  3. Monitor for changes in behaviors, decreased responsiveness to staff redirection    (F32.A) Depression, unspecified depression type  Comment: mood gen. Stable.  Plan: 1. Cont. Lexapro  2. Monitor for insomnia, isolative behaviors  3. Follow po intake, wt.s, for further wt. Loss, consider decrease in aricept dose              Electronically signed by:  REI Reagan CNP              Sincerely,        REI Reagan CNP    Electronically signed

## 2025-08-20 ENCOUNTER — TELEPHONE (OUTPATIENT)
Dept: GERIATRICS | Facility: CLINIC | Age: 70
End: 2025-08-20
Payer: MEDICARE

## 2025-08-20 DIAGNOSIS — F41.9 ANXIETY: Primary | ICD-10-CM

## 2025-08-20 RX ORDER — HYDROXYZINE HYDROCHLORIDE 25 MG/1
TABLET, FILM COATED ORAL
Status: SHIPPED
Start: 2025-08-20

## 2025-08-20 RX ORDER — HYDROXYZINE HYDROCHLORIDE 25 MG/1
TABLET, FILM COATED ORAL
COMMUNITY
End: 2025-08-20

## 2025-08-21 ENCOUNTER — TELEPHONE (OUTPATIENT)
Dept: GERIATRICS | Facility: CLINIC | Age: 70
End: 2025-08-21
Payer: MEDICARE

## 2025-09-02 ENCOUNTER — HOSPITAL ENCOUNTER (EMERGENCY)
Facility: CLINIC | Age: 70
Discharge: HOME | End: 2025-09-02
Attending: EMERGENCY MEDICINE | Admitting: EMERGENCY MEDICINE
Payer: MEDICARE

## 2025-09-02 ENCOUNTER — APPOINTMENT (OUTPATIENT)
Dept: CT IMAGING | Facility: CLINIC | Age: 70
End: 2025-09-02
Attending: EMERGENCY MEDICINE
Payer: MEDICARE

## 2025-09-02 ENCOUNTER — TELEPHONE (OUTPATIENT)
Dept: GERIATRICS | Facility: CLINIC | Age: 70
End: 2025-09-02
Payer: MEDICARE

## 2025-09-02 ENCOUNTER — APPOINTMENT (OUTPATIENT)
Dept: GENERAL RADIOLOGY | Facility: CLINIC | Age: 70
End: 2025-09-02
Attending: EMERGENCY MEDICINE
Payer: MEDICARE

## 2025-09-02 VITALS
TEMPERATURE: 97.4 F | DIASTOLIC BLOOD PRESSURE: 105 MMHG | OXYGEN SATURATION: 93 % | SYSTOLIC BLOOD PRESSURE: 157 MMHG | RESPIRATION RATE: 16 BRPM | HEART RATE: 70 BPM
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
## 2025-09-02 DIAGNOSIS — R41.82 ALTERED MENTAL STATUS, UNSPECIFIED ALTERED MENTAL STATUS TYPE: ICD-10-CM

## 2025-09-02 LAB
ALBUMIN UR-MCNC: NEGATIVE MG/DL
ANION GAP SERPL CALCULATED.3IONS-SCNC: 15 MMOL/L (ref 7–15)
APPEARANCE UR: CLEAR
BASOPHILS # BLD AUTO: 0.03 10E3/UL (ref 0–0.2)
BASOPHILS NFR BLD AUTO: 0.3 %
BILIRUB UR QL STRIP: NEGATIVE
BUN SERPL-MCNC: 15.1 MG/DL (ref 8–23)
CALCIUM SERPL-MCNC: 9.8 MG/DL (ref 8.8–10.4)
CHLORIDE SERPL-SCNC: 104 MMOL/L (ref 98–107)
COLOR UR AUTO: YELLOW
CREAT SERPL-MCNC: 0.81 MG/DL (ref 0.51–0.95)
EGFRCR SERPLBLD CKD-EPI 2021: 78 ML/MIN/1.73M2
EOSINOPHIL # BLD AUTO: 0.21 10E3/UL (ref 0–0.7)
EOSINOPHIL NFR BLD AUTO: 2.4 %
ERYTHROCYTE [DISTWIDTH] IN BLOOD BY AUTOMATED COUNT: 13.2 % (ref 10–15)
GLUCOSE SERPL-MCNC: 126 MG/DL (ref 70–99)
GLUCOSE UR STRIP-MCNC: NEGATIVE MG/DL
HCO3 SERPL-SCNC: 24 MMOL/L (ref 22–29)
HCT VFR BLD AUTO: 42.7 % (ref 35–47)
HGB BLD-MCNC: 14 G/DL (ref 11.7–15.7)
HGB UR QL STRIP: NEGATIVE
HOLD SPECIMEN: NORMAL
HOLD SPECIMEN: NORMAL
HYALINE CASTS: 1 /LPF
IMM GRANULOCYTES # BLD: <0.03 10E3/UL
IMM GRANULOCYTES NFR BLD: 0.2 %
KETONES UR STRIP-MCNC: NEGATIVE MG/DL
LEUKOCYTE ESTERASE UR QL STRIP: NEGATIVE
LYMPHOCYTES # BLD AUTO: 1.57 10E3/UL (ref 0.8–5.3)
LYMPHOCYTES NFR BLD AUTO: 18.1 %
MCH RBC QN AUTO: 29.4 PG (ref 26.5–33)
MCHC RBC AUTO-ENTMCNC: 32.8 G/DL (ref 31.5–36.5)
MCV RBC AUTO: 89.5 FL (ref 78–100)
MONOCYTES # BLD AUTO: 0.63 10E3/UL (ref 0–1.3)
MONOCYTES NFR BLD AUTO: 7.3 %
MUCOUS THREADS #/AREA URNS LPF: PRESENT /LPF
NEUTROPHILS # BLD AUTO: 6.2 10E3/UL (ref 1.6–8.3)
NEUTROPHILS NFR BLD AUTO: 71.7 %
NITRATE UR QL: NEGATIVE
NRBC # BLD AUTO: <0.03 10E3/UL
NRBC BLD AUTO-RTO: 0 /100
PH UR STRIP: 5.5 [PH] (ref 5–7)
PLATELET # BLD AUTO: 307 10E3/UL (ref 150–450)
POTASSIUM SERPL-SCNC: 3.6 MMOL/L (ref 3.4–5.3)
RBC # BLD AUTO: 4.77 10E6/UL (ref 3.8–5.2)
RBC URINE: 1 /HPF
SODIUM SERPL-SCNC: 143 MMOL/L (ref 135–145)
SP GR UR STRIP: 1.03 (ref 1–1.03)
SQUAMOUS EPITHELIAL: 1 /HPF
UROBILINOGEN UR STRIP-MCNC: NORMAL MG/DL
WBC # BLD AUTO: 8.66 10E3/UL (ref 4–11)
WBC URINE: 1 /HPF

## 2025-09-02 PROCEDURE — 82374 ASSAY BLOOD CARBON DIOXIDE: CPT | Performed by: EMERGENCY MEDICINE

## 2025-09-02 PROCEDURE — 73522 X-RAY EXAM HIPS BI 3-4 VIEWS: CPT

## 2025-09-02 PROCEDURE — 250N000009 HC RX 250: Performed by: EMERGENCY MEDICINE

## 2025-09-02 PROCEDURE — 85025 COMPLETE CBC W/AUTO DIFF WBC: CPT | Performed by: EMERGENCY MEDICINE

## 2025-09-02 PROCEDURE — 99285 EMERGENCY DEPT VISIT HI MDM: CPT | Mod: 25 | Performed by: EMERGENCY MEDICINE

## 2025-09-02 PROCEDURE — 36415 COLL VENOUS BLD VENIPUNCTURE: CPT | Performed by: EMERGENCY MEDICINE

## 2025-09-02 PROCEDURE — 70450 CT HEAD/BRAIN W/O DYE: CPT

## 2025-09-02 PROCEDURE — 74177 CT ABD & PELVIS W/CONTRAST: CPT

## 2025-09-02 PROCEDURE — 999N000104 CT LUMBAR SPINE RECONSTRUCTED

## 2025-09-02 PROCEDURE — 81003 URINALYSIS AUTO W/O SCOPE: CPT | Performed by: EMERGENCY MEDICINE

## 2025-09-02 PROCEDURE — 250N000013 HC RX MED GY IP 250 OP 250 PS 637: Performed by: EMERGENCY MEDICINE

## 2025-09-02 PROCEDURE — 255N000002 HC RX 255 OP 636: Performed by: EMERGENCY MEDICINE

## 2025-09-02 RX ORDER — ACETAMINOPHEN 325 MG/1
975 TABLET ORAL ONCE
Status: COMPLETED | OUTPATIENT
Start: 2025-09-02 | End: 2025-09-02

## 2025-09-02 RX ADMIN — IOHEXOL 91 ML: 350 INJECTION, SOLUTION INTRAVENOUS at 16:33

## 2025-09-02 RX ADMIN — SODIUM CHLORIDE 63 ML: 9 INJECTION, SOLUTION INTRAVENOUS at 16:33

## 2025-09-02 RX ADMIN — ACETAMINOPHEN 975 MG: 325 TABLET ORAL at 17:04

## 2025-09-02 RX ADMIN — MIDAZOLAM 2 MG: 5 INJECTION INTRAMUSCULAR; INTRAVENOUS at 19:22

## 2025-09-02 ASSESSMENT — ACTIVITIES OF DAILY LIVING (ADL)
ADLS_ACUITY_SCORE: 41

## 2025-09-02 ASSESSMENT — COLUMBIA-SUICIDE SEVERITY RATING SCALE - C-SSRS
2. HAVE YOU ACTUALLY HAD ANY THOUGHTS OF KILLING YOURSELF IN THE PAST MONTH?: NO
1. IN THE PAST MONTH, HAVE YOU WISHED YOU WERE DEAD OR WISHED YOU COULD GO TO SLEEP AND NOT WAKE UP?: NO
6. HAVE YOU EVER DONE ANYTHING, STARTED TO DO ANYTHING, OR PREPARED TO DO ANYTHING TO END YOUR LIFE?: NO

## 2025-09-04 ENCOUNTER — ASSISTED LIVING VISIT (OUTPATIENT)
Dept: GERIATRICS | Facility: CLINIC | Age: 70
End: 2025-09-04
Payer: MEDICARE

## 2025-09-04 VITALS
RESPIRATION RATE: 18 BRPM | DIASTOLIC BLOOD PRESSURE: 52 MMHG | HEART RATE: 77 BPM | OXYGEN SATURATION: 94 % | HEIGHT: 66 IN | SYSTOLIC BLOOD PRESSURE: 99 MMHG | BODY MASS INDEX: 27.92 KG/M2
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
## 2025-09-04 DIAGNOSIS — M62.81 GENERALIZED MUSCLE WEAKNESS: Primary | ICD-10-CM

## 2025-09-04 DIAGNOSIS — F02.84 ALZHEIMER'S DEMENTIA WITH ANXIETY, UNSPECIFIED DEMENTIA SEVERITY, UNSPECIFIED TIMING OF DEMENTIA ONSET (H): ICD-10-CM

## 2025-09-04 DIAGNOSIS — G30.9 ALZHEIMER'S DEMENTIA WITH ANXIETY, UNSPECIFIED DEMENTIA SEVERITY, UNSPECIFIED TIMING OF DEMENTIA ONSET (H): ICD-10-CM
